# Patient Record
Sex: MALE | Race: WHITE | NOT HISPANIC OR LATINO
[De-identification: names, ages, dates, MRNs, and addresses within clinical notes are randomized per-mention and may not be internally consistent; named-entity substitution may affect disease eponyms.]

---

## 2017-01-26 ENCOUNTER — APPOINTMENT (OUTPATIENT)
Dept: INTERNAL MEDICINE | Facility: CLINIC | Age: 67
End: 2017-01-26

## 2017-01-26 ENCOUNTER — RESULT CHARGE (OUTPATIENT)
Age: 67
End: 2017-01-26

## 2017-02-07 ENCOUNTER — RX RENEWAL (OUTPATIENT)
Age: 67
End: 2017-02-07

## 2017-03-03 LAB
ALBUMIN SERPL ELPH-MCNC: 4.3 G/DL
ALP BLD-CCNC: 83 U/L
ALT SERPL-CCNC: 20 U/L
ANION GAP SERPL CALC-SCNC: 14 MMOL/L
AST SERPL-CCNC: 20 U/L
BILIRUB SERPL-MCNC: 0.8 MG/DL
BUN SERPL-MCNC: 16 MG/DL
CALCIUM SERPL-MCNC: 9.5 MG/DL
CHLORIDE SERPL-SCNC: 100 MMOL/L
CHOLEST SERPL-MCNC: 191 MG/DL
CHOLEST/HDLC SERPL: 1.9 RATIO
CO2 SERPL-SCNC: 25 MMOL/L
CREAT SERPL-MCNC: 0.92 MG/DL
GLUCOSE SERPL-MCNC: 118 MG/DL
HDLC SERPL-MCNC: 98 MG/DL
LDLC SERPL CALC-MCNC: 83 MG/DL
POTASSIUM SERPL-SCNC: 5.2 MMOL/L
PROT SERPL-MCNC: 7.6 G/DL
SODIUM SERPL-SCNC: 139 MMOL/L
TRIGL SERPL-MCNC: 48 MG/DL

## 2017-03-06 LAB
25(OH)D3 SERPL-MCNC: 13.2 NG/ML
HBA1C MFR BLD HPLC: 6.6 %

## 2017-03-09 ENCOUNTER — APPOINTMENT (OUTPATIENT)
Dept: ENDOCRINOLOGY | Facility: CLINIC | Age: 67
End: 2017-03-09

## 2017-03-09 VITALS
HEART RATE: 62 BPM | OXYGEN SATURATION: 94 % | DIASTOLIC BLOOD PRESSURE: 70 MMHG | HEIGHT: 72 IN | WEIGHT: 164 LBS | SYSTOLIC BLOOD PRESSURE: 110 MMHG | BODY MASS INDEX: 22.21 KG/M2

## 2017-03-16 LAB
CREAT SPEC-SCNC: 96 MG/DL
MICROALBUMIN 24H UR DL<=1MG/L-MCNC: 5.4 MG/DL
MICROALBUMIN/CREAT 24H UR-RTO: 56 UG/MG

## 2017-05-18 ENCOUNTER — OUTPATIENT (OUTPATIENT)
Dept: OUTPATIENT SERVICES | Facility: HOSPITAL | Age: 67
LOS: 1 days | End: 2017-05-18
Payer: COMMERCIAL

## 2017-05-18 ENCOUNTER — APPOINTMENT (OUTPATIENT)
Dept: RADIOLOGY | Facility: CLINIC | Age: 67
End: 2017-05-18

## 2017-05-18 ENCOUNTER — APPOINTMENT (OUTPATIENT)
Dept: INTERNAL MEDICINE | Facility: CLINIC | Age: 67
End: 2017-05-18

## 2017-05-18 VITALS
HEART RATE: 64 BPM | SYSTOLIC BLOOD PRESSURE: 130 MMHG | TEMPERATURE: 96.4 F | DIASTOLIC BLOOD PRESSURE: 90 MMHG | WEIGHT: 166 LBS | BODY MASS INDEX: 22.51 KG/M2
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
## 2017-05-18 DIAGNOSIS — M79.609 PAIN IN UNSPECIFIED LIMB: ICD-10-CM

## 2017-05-18 PROCEDURE — 71045 X-RAY EXAM CHEST 1 VIEW: CPT

## 2017-05-18 RX ORDER — LATANOPROST/PF 0.005 %
0.01 DROPS OPHTHALMIC (EYE)
Qty: 2 | Refills: 0 | Status: DISCONTINUED | COMMUNITY
Start: 2017-03-07

## 2017-05-18 RX ORDER — TIMOLOL MALEATE 5 MG/ML
0.5 SOLUTION OPHTHALMIC
Qty: 5 | Refills: 0 | Status: DISCONTINUED | COMMUNITY
Start: 2017-04-20

## 2017-05-22 ENCOUNTER — RESULT REVIEW (OUTPATIENT)
Age: 67
End: 2017-05-22

## 2017-06-16 ENCOUNTER — APPOINTMENT (OUTPATIENT)
Dept: INTERNAL MEDICINE | Facility: CLINIC | Age: 67
End: 2017-06-16

## 2017-06-16 VITALS
BODY MASS INDEX: 22.48 KG/M2 | HEIGHT: 72 IN | WEIGHT: 166 LBS | DIASTOLIC BLOOD PRESSURE: 84 MMHG | SYSTOLIC BLOOD PRESSURE: 128 MMHG

## 2017-06-16 DIAGNOSIS — J92.9 PLEURAL PLAQUE W/OUT ASBESTOS: ICD-10-CM

## 2017-06-16 DIAGNOSIS — M40.204 UNSPECIFIED KYPHOSIS, THORACIC REGION: ICD-10-CM

## 2017-07-25 ENCOUNTER — LABORATORY RESULT (OUTPATIENT)
Age: 67
End: 2017-07-25

## 2017-07-25 ENCOUNTER — APPOINTMENT (OUTPATIENT)
Dept: INTERNAL MEDICINE | Facility: CLINIC | Age: 67
End: 2017-07-25

## 2017-07-25 VITALS
BODY MASS INDEX: 21.94 KG/M2 | SYSTOLIC BLOOD PRESSURE: 110 MMHG | HEIGHT: 72 IN | WEIGHT: 162 LBS | TEMPERATURE: 95.2 F | DIASTOLIC BLOOD PRESSURE: 70 MMHG

## 2017-07-25 DIAGNOSIS — Z01.818 ENCOUNTER FOR OTHER PREPROCEDURAL EXAMINATION: ICD-10-CM

## 2017-07-25 DIAGNOSIS — H26.9 UNSPECIFIED CATARACT: ICD-10-CM

## 2017-07-27 LAB
ALBUMIN SERPL ELPH-MCNC: 4.2 G/DL
ALP BLD-CCNC: 70 U/L
ALT SERPL-CCNC: 14 U/L
ANION GAP SERPL CALC-SCNC: 16 MMOL/L
AST SERPL-CCNC: 22 U/L
BASOPHILS # BLD AUTO: 0.02 K/UL
BASOPHILS NFR BLD AUTO: 0.5 %
BILIRUB SERPL-MCNC: 1.1 MG/DL
BUN SERPL-MCNC: 17 MG/DL
CALCIUM SERPL-MCNC: 9.5 MG/DL
CHLORIDE SERPL-SCNC: 98 MMOL/L
CO2 SERPL-SCNC: 26 MMOL/L
CREAT SERPL-MCNC: 1.08 MG/DL
EOSINOPHIL # BLD AUTO: 0.13 K/UL
EOSINOPHIL NFR BLD AUTO: 3.1 %
GLUCOSE SERPL-MCNC: 187 MG/DL
HCT VFR BLD CALC: 41.2 %
HGB BLD-MCNC: 13.6 G/DL
IMM GRANULOCYTES NFR BLD AUTO: 0 %
LYMPHOCYTES # BLD AUTO: 1.12 K/UL
LYMPHOCYTES NFR BLD AUTO: 26.4 %
MAN DIFF?: NORMAL
MCHC RBC-ENTMCNC: 29.7 PG
MCHC RBC-ENTMCNC: 33 GM/DL
MCV RBC AUTO: 90 FL
MONOCYTES # BLD AUTO: 0.29 K/UL
MONOCYTES NFR BLD AUTO: 6.8 %
NEUTROPHILS # BLD AUTO: 2.69 K/UL
NEUTROPHILS NFR BLD AUTO: 63.2 %
PLATELET # BLD AUTO: 232 K/UL
POTASSIUM SERPL-SCNC: 4.8 MMOL/L
PROT SERPL-MCNC: 7.3 G/DL
RBC # BLD: 4.58 M/UL
RBC # FLD: 15.4 %
SODIUM SERPL-SCNC: 140 MMOL/L
WBC # FLD AUTO: 4.25 K/UL

## 2017-09-13 ENCOUNTER — APPOINTMENT (OUTPATIENT)
Dept: ENDOCRINOLOGY | Facility: CLINIC | Age: 67
End: 2017-09-13
Payer: MEDICARE

## 2017-09-13 VITALS
HEART RATE: 61 BPM | OXYGEN SATURATION: 98 % | DIASTOLIC BLOOD PRESSURE: 80 MMHG | BODY MASS INDEX: 22.21 KG/M2 | WEIGHT: 164 LBS | SYSTOLIC BLOOD PRESSURE: 118 MMHG | HEIGHT: 72 IN

## 2017-09-13 LAB
GLUCOSE BLDC GLUCOMTR-MCNC: 155
HBA1C MFR BLD HPLC: 6.3

## 2017-09-13 PROCEDURE — 83036 HEMOGLOBIN GLYCOSYLATED A1C: CPT | Mod: QW

## 2017-09-13 PROCEDURE — 99214 OFFICE O/P EST MOD 30 MIN: CPT

## 2017-09-13 PROCEDURE — 82962 GLUCOSE BLOOD TEST: CPT

## 2017-12-06 ENCOUNTER — APPOINTMENT (OUTPATIENT)
Dept: CARDIOLOGY | Facility: CLINIC | Age: 67
End: 2017-12-06

## 2017-12-12 ENCOUNTER — APPOINTMENT (OUTPATIENT)
Dept: INTERNAL MEDICINE | Facility: CLINIC | Age: 67
End: 2017-12-12

## 2018-01-09 ENCOUNTER — MEDICATION RENEWAL (OUTPATIENT)
Age: 68
End: 2018-01-09

## 2018-01-17 ENCOUNTER — LABORATORY RESULT (OUTPATIENT)
Age: 68
End: 2018-01-17

## 2018-01-17 ENCOUNTER — APPOINTMENT (OUTPATIENT)
Dept: CARDIOLOGY | Facility: CLINIC | Age: 68
End: 2018-01-17
Payer: MEDICARE

## 2018-01-17 ENCOUNTER — NON-APPOINTMENT (OUTPATIENT)
Age: 68
End: 2018-01-17

## 2018-01-17 VITALS
SYSTOLIC BLOOD PRESSURE: 146 MMHG | OXYGEN SATURATION: 100 % | BODY MASS INDEX: 8.54 KG/M2 | DIASTOLIC BLOOD PRESSURE: 88 MMHG | HEART RATE: 58 BPM | WEIGHT: 63 LBS

## 2018-01-17 VITALS — SYSTOLIC BLOOD PRESSURE: 136 MMHG | DIASTOLIC BLOOD PRESSURE: 86 MMHG

## 2018-01-17 DIAGNOSIS — N20.0 CALCULUS OF KIDNEY: ICD-10-CM
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
## 2018-01-17 DIAGNOSIS — R79.89 OTHER SPECIFIED ABNORMAL FINDINGS OF BLOOD CHEMISTRY: ICD-10-CM

## 2018-01-17 DIAGNOSIS — N40.0 BENIGN PROSTATIC HYPERPLASIA WITHOUT LOWER URINARY TRACT SYMPMS: ICD-10-CM

## 2018-01-17 PROCEDURE — 93000 ELECTROCARDIOGRAM COMPLETE: CPT

## 2018-01-17 PROCEDURE — 93040 RHYTHM ECG WITH REPORT: CPT | Mod: 59

## 2018-01-17 PROCEDURE — 36415 COLL VENOUS BLD VENIPUNCTURE: CPT

## 2018-01-17 PROCEDURE — 99204 OFFICE O/P NEW MOD 45 MIN: CPT

## 2018-01-17 RX ORDER — BENZONATATE 100 MG/1
100 CAPSULE ORAL
Qty: 45 | Refills: 0 | Status: DISCONTINUED | COMMUNITY
Start: 2017-05-18 | End: 2018-01-17

## 2018-01-17 RX ORDER — ERGOCALCIFEROL 1.25 MG/1
1.25 MG CAPSULE, LIQUID FILLED ORAL
Qty: 12 | Refills: 0 | Status: DISCONTINUED | COMMUNITY
Start: 2017-03-09 | End: 2018-01-17

## 2018-01-28 ENCOUNTER — TRANSCRIPTION ENCOUNTER (OUTPATIENT)
Age: 68
End: 2018-01-28

## 2018-01-30 ENCOUNTER — APPOINTMENT (OUTPATIENT)
Dept: INFECTIOUS DISEASE | Facility: CLINIC | Age: 68
End: 2018-01-30
Payer: SELF-PAY

## 2018-01-30 ENCOUNTER — MEDICATION RENEWAL (OUTPATIENT)
Age: 68
End: 2018-01-30

## 2018-01-30 DIAGNOSIS — Z71.89 OTHER SPECIFIED COUNSELING: ICD-10-CM

## 2018-01-30 PROCEDURE — 90472 IMMUNIZATION ADMIN EACH ADD: CPT | Mod: NC

## 2018-01-30 PROCEDURE — 90691 TYPHOID VACCINE IM: CPT

## 2018-01-30 PROCEDURE — 99401 PREV MED CNSL INDIV APPRX 15: CPT | Mod: 25

## 2018-01-30 PROCEDURE — 90471 IMMUNIZATION ADMIN: CPT | Mod: NC

## 2018-01-30 PROCEDURE — 90632 HEPA VACCINE ADULT IM: CPT

## 2018-02-03 LAB
ALBUMIN SERPL ELPH-MCNC: 4.2 G/DL
ALP BLD-CCNC: 65 U/L
ALT SERPL-CCNC: 18 U/L
ANION GAP SERPL CALC-SCNC: 11 MMOL/L
AST SERPL-CCNC: 22 U/L
BASOPHILS # BLD AUTO: 0.03 K/UL
BASOPHILS NFR BLD AUTO: 0.6 %
BILIRUB SERPL-MCNC: 0.7 MG/DL
BUN SERPL-MCNC: 19 MG/DL
CALCIUM SERPL-MCNC: 9.4 MG/DL
CHLORIDE SERPL-SCNC: 97 MMOL/L
CHOLEST SERPL-MCNC: 186 MG/DL
CHOLEST/HDLC SERPL: 2.2 RATIO
CO2 SERPL-SCNC: 28 MMOL/L
CREAT SERPL-MCNC: 1.01 MG/DL
EOSINOPHIL # BLD AUTO: 0.21 K/UL
EOSINOPHIL NFR BLD AUTO: 4 %
GLUCOSE SERPL-MCNC: 119 MG/DL
HBA1C MFR BLD HPLC: 6.6 %
HCT VFR BLD CALC: 45 %
HDLC SERPL-MCNC: 83 MG/DL
HGB BLD-MCNC: 14.9 G/DL
IMM GRANULOCYTES NFR BLD AUTO: 0.2 %
LDLC SERPL CALC-MCNC: 92 MG/DL
LDLC SERPL DIRECT ASSAY-MCNC: 96 MG/DL
LYMPHOCYTES # BLD AUTO: 1.23 K/UL
LYMPHOCYTES NFR BLD AUTO: 23.7 %
MAN DIFF?: NORMAL
MCHC RBC-ENTMCNC: 30 PG
MCHC RBC-ENTMCNC: 33.1 GM/DL
MCV RBC AUTO: 90.7 FL
MONOCYTES # BLD AUTO: 0.58 K/UL
MONOCYTES NFR BLD AUTO: 11.2 %
NEUTROPHILS # BLD AUTO: 3.14 K/UL
NEUTROPHILS NFR BLD AUTO: 60.3 %
PLATELET # BLD AUTO: 189 K/UL
POTASSIUM SERPL-SCNC: 4.2 MMOL/L
PROT SERPL-MCNC: 7.9 G/DL
RBC # BLD: 4.96 M/UL
RBC # FLD: 14.7 %
SODIUM SERPL-SCNC: 136 MMOL/L
T3 SERPL-MCNC: 96 NG/DL
T3RU NFR SERPL: 0.94 INDEX
T4 FREE SERPL-MCNC: 1.5 NG/DL
T4 SERPL-MCNC: 6.6 UG/DL
TRIGL SERPL-MCNC: 55 MG/DL
TSH SERPL-ACNC: 3.63 UIU/ML
WBC # FLD AUTO: 5.2 K/UL

## 2018-02-13 ENCOUNTER — MEDICATION RENEWAL (OUTPATIENT)
Age: 68
End: 2018-02-13

## 2018-02-19 ENCOUNTER — NON-APPOINTMENT (OUTPATIENT)
Age: 68
End: 2018-02-19

## 2018-03-08 ENCOUNTER — RX RENEWAL (OUTPATIENT)
Age: 68
End: 2018-03-08

## 2018-03-14 ENCOUNTER — APPOINTMENT (OUTPATIENT)
Dept: ENDOCRINOLOGY | Facility: CLINIC | Age: 68
End: 2018-03-14
Payer: MEDICARE

## 2018-03-14 VITALS
OXYGEN SATURATION: 95 % | SYSTOLIC BLOOD PRESSURE: 120 MMHG | WEIGHT: 162 LBS | BODY MASS INDEX: 21.94 KG/M2 | HEART RATE: 58 BPM | HEIGHT: 72 IN | DIASTOLIC BLOOD PRESSURE: 60 MMHG

## 2018-03-14 PROCEDURE — 99214 OFFICE O/P EST MOD 30 MIN: CPT

## 2018-03-14 RX ORDER — AZITHROMYCIN 250 MG/1
250 TABLET, FILM COATED ORAL
Qty: 4 | Refills: 0 | Status: DISCONTINUED | COMMUNITY
Start: 2018-01-30 | End: 2018-03-14

## 2018-03-14 RX ORDER — OSELTAMIVIR PHOSPHATE 75 MG/1
75 CAPSULE ORAL TWICE DAILY
Qty: 10 | Refills: 0 | Status: DISCONTINUED | COMMUNITY
Start: 2018-02-12 | End: 2018-03-14

## 2018-03-15 LAB
CREAT SPEC-SCNC: 57 MG/DL
MICROALBUMIN 24H UR DL<=1MG/L-MCNC: 4.2 MG/DL
MICROALBUMIN/CREAT 24H UR-RTO: 74 MG/G

## 2018-03-16 ENCOUNTER — RX RENEWAL (OUTPATIENT)
Age: 68
End: 2018-03-16

## 2018-04-02 ENCOUNTER — APPOINTMENT (OUTPATIENT)
Dept: CARDIOLOGY | Facility: CLINIC | Age: 68
End: 2018-04-02
Payer: MEDICARE

## 2018-04-02 PROCEDURE — 93306 TTE W/DOPPLER COMPLETE: CPT

## 2018-04-16 ENCOUNTER — NON-APPOINTMENT (OUTPATIENT)
Age: 68
End: 2018-04-16

## 2018-04-16 ENCOUNTER — APPOINTMENT (OUTPATIENT)
Dept: CARDIOLOGY | Facility: CLINIC | Age: 68
End: 2018-04-16
Payer: MEDICARE

## 2018-04-16 VITALS — SYSTOLIC BLOOD PRESSURE: 130 MMHG | HEART RATE: 54 BPM | OXYGEN SATURATION: 100 % | DIASTOLIC BLOOD PRESSURE: 80 MMHG

## 2018-04-16 VITALS — WEIGHT: 165 LBS | BODY MASS INDEX: 22.38 KG/M2

## 2018-04-16 VITALS — SYSTOLIC BLOOD PRESSURE: 124 MMHG | DIASTOLIC BLOOD PRESSURE: 86 MMHG

## 2018-04-16 DIAGNOSIS — D41.4 NEOPLASM OF UNCERTAIN BEHAVIOR OF BLADDER: ICD-10-CM

## 2018-04-16 DIAGNOSIS — Z78.9 OTHER SPECIFIED HEALTH STATUS: ICD-10-CM

## 2018-04-16 PROCEDURE — 36415 COLL VENOUS BLD VENIPUNCTURE: CPT

## 2018-04-16 PROCEDURE — 93000 ELECTROCARDIOGRAM COMPLETE: CPT

## 2018-04-16 PROCEDURE — 93040 RHYTHM ECG WITH REPORT: CPT | Mod: 59

## 2018-04-16 PROCEDURE — 99215 OFFICE O/P EST HI 40 MIN: CPT

## 2018-04-18 ENCOUNTER — NON-APPOINTMENT (OUTPATIENT)
Age: 68
End: 2018-04-18

## 2018-04-21 ENCOUNTER — NON-APPOINTMENT (OUTPATIENT)
Age: 68
End: 2018-04-21

## 2018-04-23 ENCOUNTER — APPOINTMENT (OUTPATIENT)
Dept: CARDIOLOGY | Facility: CLINIC | Age: 68
End: 2018-04-23
Payer: MEDICARE

## 2018-04-23 VITALS
HEART RATE: 66 BPM | SYSTOLIC BLOOD PRESSURE: 143 MMHG | BODY MASS INDEX: 22.11 KG/M2 | WEIGHT: 163 LBS | DIASTOLIC BLOOD PRESSURE: 84 MMHG

## 2018-04-23 PROCEDURE — 93351 STRESS TTE COMPLETE: CPT

## 2018-04-23 PROCEDURE — 93325 DOPPLER ECHO COLOR FLOW MAPG: CPT

## 2018-04-23 PROCEDURE — 93320 DOPPLER ECHO COMPLETE: CPT

## 2018-04-23 PROCEDURE — 99214 OFFICE O/P EST MOD 30 MIN: CPT | Mod: 25

## 2018-04-29 ENCOUNTER — NON-APPOINTMENT (OUTPATIENT)
Age: 68
End: 2018-04-29

## 2018-05-06 LAB
ALBUMIN SERPL ELPH-MCNC: 4.5 G/DL
ALP BLD-CCNC: 71 U/L
ALT SERPL-CCNC: 23 U/L
ANION GAP SERPL CALC-SCNC: 10 MMOL/L
APPEARANCE: CLEAR
AST SERPL-CCNC: 28 U/L
BACTERIA: NEGATIVE
BASOPHILS # BLD AUTO: 0.04 K/UL
BASOPHILS NFR BLD AUTO: 0.8 %
BILIRUB SERPL-MCNC: 0.8 MG/DL
BILIRUBIN URINE: NEGATIVE
BLOOD URINE: NEGATIVE
BUN SERPL-MCNC: 17 MG/DL
CALCIUM SERPL-MCNC: 9.3 MG/DL
CHLORIDE SERPL-SCNC: 101 MMOL/L
CHOLEST SERPL-MCNC: 136 MG/DL
CHOLEST/HDLC SERPL: 1.6 RATIO
CO2 SERPL-SCNC: 29 MMOL/L
COLOR: YELLOW
CREAT SERPL-MCNC: 0.98 MG/DL
EOSINOPHIL # BLD AUTO: 0.15 K/UL
EOSINOPHIL NFR BLD AUTO: 3.1 %
GLUCOSE QUALITATIVE U: NEGATIVE MG/DL
GLUCOSE SERPL-MCNC: 111 MG/DL
HBA1C MFR BLD HPLC: 6.7 %
HCT VFR BLD CALC: 41.4 %
HDLC SERPL-MCNC: 83 MG/DL
HGB BLD-MCNC: 13.4 G/DL
HYALINE CASTS: 6 /LPF
IMM GRANULOCYTES NFR BLD AUTO: 0.2 %
KETONES URINE: NEGATIVE
LDLC SERPL CALC-MCNC: 47 MG/DL
LDLC SERPL DIRECT ASSAY-MCNC: 52 MG/DL
LEUKOCYTE ESTERASE URINE: NEGATIVE
LYMPHOCYTES # BLD AUTO: 1.36 K/UL
LYMPHOCYTES NFR BLD AUTO: 27.9 %
MAN DIFF?: NORMAL
MCHC RBC-ENTMCNC: 29.6 PG
MCHC RBC-ENTMCNC: 32.4 GM/DL
MCV RBC AUTO: 91.4 FL
MICROSCOPIC-UA: NORMAL
MONOCYTES # BLD AUTO: 0.42 K/UL
MONOCYTES NFR BLD AUTO: 8.6 %
NEUTROPHILS # BLD AUTO: 2.9 K/UL
NEUTROPHILS NFR BLD AUTO: 59.4 %
NITRITE URINE: NEGATIVE
PH URINE: 6
PLATELET # BLD AUTO: 194 K/UL
POTASSIUM SERPL-SCNC: 4.8 MMOL/L
PROT SERPL-MCNC: 7.3 G/DL
PROTEIN URINE: NEGATIVE MG/DL
PSA SERPL-MCNC: 1.89 NG/ML
RBC # BLD: 4.53 M/UL
RBC # FLD: 15.7 %
RED BLOOD CELLS URINE: 6 /HPF
SODIUM SERPL-SCNC: 140 MMOL/L
SPECIFIC GRAVITY URINE: 1.01
SQUAMOUS EPITHELIAL CELLS: 1 /HPF
TRIGL SERPL-MCNC: 30 MG/DL
UROBILINOGEN URINE: NEGATIVE MG/DL
WBC # FLD AUTO: 4.88 K/UL
WHITE BLOOD CELLS URINE: 5 /HPF

## 2018-06-13 ENCOUNTER — RX RENEWAL (OUTPATIENT)
Age: 68
End: 2018-06-13

## 2018-08-09 ENCOUNTER — APPOINTMENT (OUTPATIENT)
Dept: CARDIOLOGY | Facility: CLINIC | Age: 68
End: 2018-08-09
Payer: MEDICARE

## 2018-08-09 ENCOUNTER — NON-APPOINTMENT (OUTPATIENT)
Age: 68
End: 2018-08-09

## 2018-08-09 VITALS
TEMPERATURE: 97.8 F | BODY MASS INDEX: 21.94 KG/M2 | HEIGHT: 72 IN | WEIGHT: 162 LBS | OXYGEN SATURATION: 98 % | HEART RATE: 58 BPM

## 2018-08-09 PROCEDURE — 93040 RHYTHM ECG WITH REPORT: CPT

## 2018-08-09 PROCEDURE — 99214 OFFICE O/P EST MOD 30 MIN: CPT | Mod: 25

## 2018-08-09 PROCEDURE — 36415 COLL VENOUS BLD VENIPUNCTURE: CPT

## 2018-08-31 LAB
ALBUMIN SERPL ELPH-MCNC: 4.3 G/DL
ALP BLD-CCNC: 55 U/L
ALT SERPL-CCNC: 20 U/L
ANION GAP SERPL CALC-SCNC: 14 MMOL/L
AST SERPL-CCNC: 20 U/L
BILIRUB SERPL-MCNC: 1 MG/DL
BUN SERPL-MCNC: 21 MG/DL
CALCIUM SERPL-MCNC: 9.4 MG/DL
CHLORIDE SERPL-SCNC: 97 MMOL/L
CHOLEST SERPL-MCNC: 128 MG/DL
CHOLEST/HDLC SERPL: 1.7 RATIO
CO2 SERPL-SCNC: 28 MMOL/L
CREAT SERPL-MCNC: 1.03 MG/DL
GLUCOSE SERPL-MCNC: 176 MG/DL
HBA1C MFR BLD HPLC: 6.6 %
HDLC SERPL-MCNC: 76 MG/DL
LDLC SERPL CALC-MCNC: 43 MG/DL
LDLC SERPL DIRECT ASSAY-MCNC: 52 MG/DL
POTASSIUM SERPL-SCNC: 4.9 MMOL/L
PROT SERPL-MCNC: 6.9 G/DL
SODIUM SERPL-SCNC: 138 MMOL/L
TRIGL SERPL-MCNC: 44 MG/DL

## 2018-09-06 ENCOUNTER — RX RENEWAL (OUTPATIENT)
Age: 68
End: 2018-09-06

## 2018-09-18 ENCOUNTER — APPOINTMENT (OUTPATIENT)
Dept: ENDOCRINOLOGY | Facility: CLINIC | Age: 68
End: 2018-09-18

## 2018-09-18 ENCOUNTER — APPOINTMENT (OUTPATIENT)
Dept: ENDOCRINOLOGY | Facility: CLINIC | Age: 68
End: 2018-09-18
Payer: MEDICARE

## 2018-09-18 VITALS — SYSTOLIC BLOOD PRESSURE: 126 MMHG | WEIGHT: 162 LBS | DIASTOLIC BLOOD PRESSURE: 80 MMHG | BODY MASS INDEX: 21.97 KG/M2

## 2018-09-18 LAB — HBA1C MFR BLD HPLC: 6.3

## 2018-09-18 PROCEDURE — G0108 DIAB MANAGE TRN  PER INDIV: CPT

## 2018-09-18 PROCEDURE — 83036 HEMOGLOBIN GLYCOSYLATED A1C: CPT | Mod: QW

## 2018-09-20 ENCOUNTER — RX RENEWAL (OUTPATIENT)
Age: 68
End: 2018-09-20

## 2018-10-07 ENCOUNTER — NON-APPOINTMENT (OUTPATIENT)
Age: 68
End: 2018-10-07

## 2018-11-12 ENCOUNTER — RX RENEWAL (OUTPATIENT)
Age: 68
End: 2018-11-12

## 2018-12-03 ENCOUNTER — APPOINTMENT (OUTPATIENT)
Dept: CARDIOLOGY | Facility: CLINIC | Age: 68
End: 2018-12-03
Payer: MEDICARE

## 2018-12-03 ENCOUNTER — NON-APPOINTMENT (OUTPATIENT)
Age: 68
End: 2018-12-03

## 2018-12-03 VITALS — SYSTOLIC BLOOD PRESSURE: 124 MMHG | DIASTOLIC BLOOD PRESSURE: 82 MMHG

## 2018-12-03 VITALS
WEIGHT: 159 LBS | DIASTOLIC BLOOD PRESSURE: 80 MMHG | BODY MASS INDEX: 21.56 KG/M2 | OXYGEN SATURATION: 100 % | HEART RATE: 53 BPM | SYSTOLIC BLOOD PRESSURE: 122 MMHG

## 2018-12-03 PROCEDURE — 93040 RHYTHM ECG WITH REPORT: CPT | Mod: 59

## 2018-12-03 PROCEDURE — 99214 OFFICE O/P EST MOD 30 MIN: CPT

## 2018-12-03 PROCEDURE — 93000 ELECTROCARDIOGRAM COMPLETE: CPT

## 2018-12-03 PROCEDURE — 36415 COLL VENOUS BLD VENIPUNCTURE: CPT

## 2018-12-03 NOTE — REASON FOR VISIT
[Follow-Up - Clinic] : a clinic follow-up of [Hyperlipidemia] : hyperlipidemia [Hypertension] : hypertension [FreeTextEntry1] : DM

## 2018-12-05 ENCOUNTER — RX RENEWAL (OUTPATIENT)
Age: 68
End: 2018-12-05

## 2018-12-19 LAB
ALBUMIN SERPL ELPH-MCNC: 4.5 G/DL
ALP BLD-CCNC: 56 U/L
ALT SERPL-CCNC: 23 U/L
ANION GAP SERPL CALC-SCNC: 12 MMOL/L
AST SERPL-CCNC: 22 U/L
BILIRUB SERPL-MCNC: 1.2 MG/DL
BUN SERPL-MCNC: 20 MG/DL
CALCIUM SERPL-MCNC: 9.3 MG/DL
CHLORIDE SERPL-SCNC: 100 MMOL/L
CHOLEST SERPL-MCNC: 137 MG/DL
CHOLEST/HDLC SERPL: 1.8 RATIO
CO2 SERPL-SCNC: 28 MMOL/L
CREAT SERPL-MCNC: 1 MG/DL
GLUCOSE SERPL-MCNC: 117 MG/DL
HBA1C MFR BLD HPLC: 6.4 %
HDLC SERPL-MCNC: 76 MG/DL
LDLC SERPL CALC-MCNC: 51 MG/DL
LDLC SERPL DIRECT ASSAY-MCNC: 62 MG/DL
POTASSIUM SERPL-SCNC: 4.9 MMOL/L
PROT SERPL-MCNC: 7.7 G/DL
SODIUM SERPL-SCNC: 139 MMOL/L
TRIGL SERPL-MCNC: 50 MG/DL

## 2018-12-30 ENCOUNTER — NON-APPOINTMENT (OUTPATIENT)
Age: 68
End: 2018-12-30

## 2019-01-18 ENCOUNTER — APPOINTMENT (OUTPATIENT)
Dept: ORTHOPEDIC SURGERY | Facility: CLINIC | Age: 69
End: 2019-01-18
Payer: MEDICARE

## 2019-01-18 VITALS — BODY MASS INDEX: 21.54 KG/M2 | HEIGHT: 72 IN | WEIGHT: 159 LBS

## 2019-01-18 DIAGNOSIS — M75.82 OTHER SHOULDER LESIONS, LEFT SHOULDER: ICD-10-CM

## 2019-01-18 DIAGNOSIS — M47.812 SPONDYLOSIS W/OUT MYELOPATHY OR RADICULOPATHY, CERVICAL REGION: ICD-10-CM

## 2019-01-18 PROCEDURE — 99204 OFFICE O/P NEW MOD 45 MIN: CPT

## 2019-01-18 PROCEDURE — 73030 X-RAY EXAM OF SHOULDER: CPT | Mod: LT

## 2019-01-18 NOTE — HISTORY OF PRESENT ILLNESS
[de-identified] : Patient is right-hand dominant and complains of left shoulder pain, which started 6 months ago. He notes that it has been bothering him for many months. There was no injury. He notes stiffness and pain when raising the arm forward and out to the side. He denies weakness in his left shoulder but his range of motion is restricted. He also notes pain that radiates into his upper arm. He denies pain while sleeping and has no numbness and tingling. The pain does not wake him up from sleep. He has not noticed any decreased strength.

## 2019-01-18 NOTE — ADDENDUM
[FreeTextEntry1] : I, Guero Campoverde , acted solely as a scribe for Dr. Mamadou Sparrow on this date 01/18/2019.\par All medical record entries made by the Scribe were at my, Dr. Mamadou Sparrow, direction and personally dictated by me on 01/18/2019. I have reviewed the chart and agree that the record accurately reflects my personal performance of the history, physical exam, assessment and plan. I have also personally directed, reviewed, and agreed with the chart. \par

## 2019-01-18 NOTE — PHYSICAL EXAM
[UE] : Sensory: Intact in bilateral upper extremities [de-identified] : Constitutional\par o Appearance : well-nourished, well developed, alert, in no acute distress \par Head and Face\par o Head :\par ¦ Inspection : atraumatic, normocephalic\par Neurologic\par o Mental Status Examination :\par ¦ Orientation : alert and oriented X 3\par Psychiatric\par o Mood and Affect: mood normal, affect appropriate \par Cardiovascular\par o Observation/Palpation : - no swelling,normal pulses, normal temperature \par Lymphatic\par o Additional Nodes : No palpable lymph nodes present \par \par Cervical Spine\par o Inspection/Palpation :\par ¦ Inspection : alignment midline, decreased degree of lordosis present,  significant kyphotic deformity\par ¦ Skin : normal appearance, no masses or tenderness, trachea midline\par ¦ Palpation : musculature is nontender to palpation\par o Range of Motion : limited rotation \par Tests: Negative Spurling test, negative Romberg test\par \par Right Upper Extremity\par o Shoulder :\par ¦ Inspection/Palpation : no tenderness, swelling or deformities\par ¦ Range of Motion : forward flexion to 115 degrees, no crepitance, non-limited internal rotation\par ¦ Strength : forward elevation, internal rotation, external rotation, adduction and abduction and supraspinatus 5/5\par ¦ Stability : no joint instability on provocative testing \par Tests: Willis negative, Neer negative, negative Eduard test negative drop arm test\par \par Left Upper Extremity\par o Shoulder :\par ¦ Inspection/Palpation : minimal anterior capsular tenderness, no swelling or deformities\par ¦ Range of Motion : forward flexion to 115 degrees, no crepitance, non-limited internal rotation\par ¦ Strength : forward elevation, internal rotation, external rotation 4+/5, adduction and abduction and supraspinatus 4-/5\par ¦ Stability : no joint instability on provocative testing\par Tests: Willis positive, positive Neer equivocal, negative Eduard test, negative drop arm test\par \par Gait: kyphotic gait, no significant extremity swelling or lymphedema\par \par Radiology Results \par o Left Shoulder : AP internal/external rotation and lateral views were obtained and revealed sclerosis of greater tuberosity with moderate glenohumeral arthritis\par \par \par

## 2019-01-18 NOTE — DISCUSSION/SUMMARY
[de-identified] : I recommend starting a course of physical therapy for strengthening and flexibility of his left shoulder. A prescription for physical therapy was provided. He is to do home exercises as instructed by physical therapy. In the interim, he should avoid heavy loading his shoulders and overhead activities. Should his pain persist in his left shoulder after 1 month, a cortisone injection will be considered. 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

## 2019-01-28 ENCOUNTER — APPOINTMENT (OUTPATIENT)
Dept: ENDOCRINOLOGY | Facility: CLINIC | Age: 69
End: 2019-01-28
Payer: MEDICARE

## 2019-01-28 VITALS
HEIGHT: 72 IN | DIASTOLIC BLOOD PRESSURE: 80 MMHG | SYSTOLIC BLOOD PRESSURE: 120 MMHG | WEIGHT: 156 LBS | BODY MASS INDEX: 21.13 KG/M2

## 2019-01-28 PROCEDURE — 99214 OFFICE O/P EST MOD 30 MIN: CPT

## 2019-01-28 NOTE — HISTORY OF PRESENT ILLNESS
[FreeTextEntry1] : Mr. TANNA NASH is 68 year old male here to follow up for type 2 DM, osteopenia. \par He used to see Dr. Cortes in our office. \par He has been diagnosed with Type 2 DM since around 2000.  He had been on medication for about 10 years. \par He has no known retinopathy, just saw eye doctor in Jan 2019.  \par He is up to date with podiatry, has ingrown toe nails, no neuropathy.  \par He reports no any neuropathy, his fingers sometimes get very cold sometimes.  \par He reports no diabetic nephropathy. (EGFR 76).\par He follows with cardiologist.  He has no prior history of macrovascular disease such as CAD, CHF or CVA. \par He is currently taking Lipitor at the recommendation of his cardiologist/pcp.   \par He checks his blood sugars every day in the morning.  \par Glucose numbers are usually 105 mg/dl this morning, sometimes it goes up to 120mg/dl.  \par He is tolerating metformin extended release 750 mg 2 tablets. (1500mg daily total). \par He does exercise.  He had ingrown toenails and now he is running a little bit less.  \par In regards to his diet, his family reports that he had lost some weight. On chart, lost about 6 lbs since Sept 2018.  He reports he is eating less because recent viral gastroenteritis.  \par Regarding osteopenia, patient has severe kyphosis march 2017 spine -2.3 wrist 0.2 fem neck -1.9 nad is on boniva tolerating well\par \par

## 2019-01-28 NOTE — ASSESSMENT
[FreeTextEntry1] : Mr. TANNA NASH is 68 year old male with well controlled Type 2 DM, HTN, HLD, osteopenia here for follow up visit.  \par \par 1. Type 2 DM\par Well controlled, A1C 6.4% in Dec 2018.  \par Continue with metformin ER 750mg daily \par Will check Vitmain B 12 level next visit (just had blood work in Dec 2018)\par Check microalbumin \par Continue with Carb. consistent diet and exercise. \par \par 2. HLD\par Stable, continue with lipitor\par check annual lipid panel\par \par 3. HTN\par Continue with Ramipril 2.5mg daily \par BP goal 130/80\par \par 4.Ostopenia at multiple sites\par Continue Ibandronate 150mg once a month\par Check DXA (last done in March 2017)\par \par \par Diabetes Health Care Maintenance\par - Opthalmology up to date: Yes\par -Podiatry up to date: Yes\par -Antiplatelet agent: Yes\par  -Urine microalbumin: Check 01/28/2019 \par -ACE-I/ARB: Yes\par -Patient to call for persistent glucose < 70 or > 300\par -Discussed hypoglycemic protocol.  \par -Yearly flu vaccine recommended \par -Vitamin B12 level if on metformin (check next visit)\par -Lipid panel: up to date \par -Statin therapy: yes\par \par EDUCATION: Reviewed 'ABC' of diabetes management (respective goals in parentheses): A1C (<7), blood pressure (<140/90), and cholesterol (LDL <100).\par \par COMPLIANCE at present is estimated to be:  Good \par FOLLOW UP: I recommend that frequency of visits for diabetes care be every 3 month \par   [Diabetes Foot Care] : diabetes foot care [Long Term Vascular Complications] : long term vascular complications of diabetes [Importance of Diet and Exercise] : importance of diet and exercise to improve glycemic control, achieve weight loss and improve cardiovascular health

## 2019-01-28 NOTE — RESULTS/DATA
[L1 - L4] : L1 - L4 [BMD ___ g/cm2] : BMD: [unfilled] g/cm2 [T-Score ___] : T-score: [unfilled] [Z-Score ___] : Z-score: [unfilled] [FreeTextEntry2] : March 2017

## 2019-02-04 ENCOUNTER — RESULT REVIEW (OUTPATIENT)
Age: 69
End: 2019-02-04

## 2019-02-04 ENCOUNTER — APPOINTMENT (OUTPATIENT)
Dept: ENDOCRINOLOGY | Facility: CLINIC | Age: 69
End: 2019-02-04
Payer: MEDICARE

## 2019-02-04 VITALS — BODY MASS INDEX: 23.11 KG/M2 | WEIGHT: 156 LBS | HEIGHT: 69 IN

## 2019-02-04 LAB
CREAT SPEC-SCNC: 139 MG/DL
MICROALBUMIN 24H UR DL<=1MG/L-MCNC: 5.5 MG/DL
MICROALBUMIN/CREAT 24H UR-RTO: 40 MG/G

## 2019-02-04 PROCEDURE — 77085 DXA BONE DENSITY AXL VRT FX: CPT | Mod: GA

## 2019-02-08 ENCOUNTER — RX RENEWAL (OUTPATIENT)
Age: 69
End: 2019-02-08

## 2019-02-28 ENCOUNTER — APPOINTMENT (OUTPATIENT)
Dept: ORTHOPEDIC SURGERY | Facility: CLINIC | Age: 69
End: 2019-02-28
Payer: MEDICARE

## 2019-02-28 VITALS — WEIGHT: 156 LBS | HEIGHT: 69 IN | BODY MASS INDEX: 23.11 KG/M2

## 2019-02-28 VITALS — HEIGHT: 69 IN | BODY MASS INDEX: 23.11 KG/M2 | WEIGHT: 156 LBS

## 2019-02-28 PROCEDURE — 99213 OFFICE O/P EST LOW 20 MIN: CPT

## 2019-03-11 ENCOUNTER — RX RENEWAL (OUTPATIENT)
Age: 69
End: 2019-03-11

## 2019-03-13 ENCOUNTER — TRANSCRIPTION ENCOUNTER (OUTPATIENT)
Age: 69
End: 2019-03-13

## 2019-04-01 ENCOUNTER — APPOINTMENT (OUTPATIENT)
Dept: ORTHOPEDIC SURGERY | Facility: CLINIC | Age: 69
End: 2019-04-01
Payer: MEDICARE

## 2019-04-01 VITALS — WEIGHT: 156 LBS | HEIGHT: 69 IN | BODY MASS INDEX: 23.11 KG/M2

## 2019-04-01 PROCEDURE — 99213 OFFICE O/P EST LOW 20 MIN: CPT

## 2019-04-02 ENCOUNTER — APPOINTMENT (OUTPATIENT)
Dept: CARDIOLOGY | Facility: CLINIC | Age: 69
End: 2019-04-02
Payer: MEDICARE

## 2019-04-02 ENCOUNTER — NON-APPOINTMENT (OUTPATIENT)
Age: 69
End: 2019-04-02

## 2019-04-02 VITALS — BODY MASS INDEX: 23.63 KG/M2 | WEIGHT: 160 LBS | HEART RATE: 50 BPM | OXYGEN SATURATION: 100 %

## 2019-04-02 VITALS — DIASTOLIC BLOOD PRESSURE: 80 MMHG | SYSTOLIC BLOOD PRESSURE: 120 MMHG

## 2019-04-02 VITALS — SYSTOLIC BLOOD PRESSURE: 124 MMHG | DIASTOLIC BLOOD PRESSURE: 80 MMHG

## 2019-04-02 VITALS — DIASTOLIC BLOOD PRESSURE: 88 MMHG | SYSTOLIC BLOOD PRESSURE: 134 MMHG

## 2019-04-02 DIAGNOSIS — G47.33 OBSTRUCTIVE SLEEP APNEA (ADULT) (PEDIATRIC): ICD-10-CM

## 2019-04-02 PROCEDURE — 93040 RHYTHM ECG WITH REPORT: CPT | Mod: 59

## 2019-04-02 PROCEDURE — 93000 ELECTROCARDIOGRAM COMPLETE: CPT

## 2019-04-02 PROCEDURE — 99214 OFFICE O/P EST MOD 30 MIN: CPT | Mod: 25

## 2019-04-02 PROCEDURE — 93306 TTE W/DOPPLER COMPLETE: CPT

## 2019-04-02 NOTE — HISTORY OF PRESENT ILLNESS
[FreeTextEntry1] : His fingers are cold and turn white in cold weather - he has had this all his life.\par His wife notes that he snores and has periods where he stops breathing.\par He is looking forward to his first trip to [place] this summer.

## 2019-04-10 ENCOUNTER — MESSAGE (OUTPATIENT)
Age: 69
End: 2019-04-10

## 2019-05-02 ENCOUNTER — APPOINTMENT (OUTPATIENT)
Dept: INFECTIOUS DISEASE | Facility: HOSPITAL | Age: 69
End: 2019-05-02
Payer: SELF-PAY

## 2019-05-02 PROCEDURE — 99401 PREV MED CNSL INDIV APPRX 15: CPT | Mod: 25

## 2019-05-02 PROCEDURE — 90717 YELLOW FEVER VACCINE SUBQ: CPT

## 2019-05-02 PROCEDURE — 90632 HEPA VACCINE ADULT IM: CPT

## 2019-05-02 PROCEDURE — 90471 IMMUNIZATION ADMIN: CPT | Mod: NC

## 2019-05-02 PROCEDURE — 90472 IMMUNIZATION ADMIN EACH ADD: CPT | Mod: NC

## 2019-05-06 ENCOUNTER — FORM ENCOUNTER (OUTPATIENT)
Age: 69
End: 2019-05-06

## 2019-05-06 ENCOUNTER — APPOINTMENT (OUTPATIENT)
Dept: ENDOCRINOLOGY | Facility: CLINIC | Age: 69
End: 2019-05-06
Payer: MEDICARE

## 2019-05-06 VITALS
HEIGHT: 69 IN | SYSTOLIC BLOOD PRESSURE: 110 MMHG | DIASTOLIC BLOOD PRESSURE: 80 MMHG | HEART RATE: 61 BPM | WEIGHT: 158 LBS | OXYGEN SATURATION: 98 % | BODY MASS INDEX: 23.4 KG/M2

## 2019-05-06 PROCEDURE — 99214 OFFICE O/P EST MOD 30 MIN: CPT

## 2019-05-06 NOTE — HISTORY OF PRESENT ILLNESS
[FreeTextEntry1] : Mr. TANNA NASH is 69 year old male here to follow up for type 2 DM, osteopenia. \par \par He has been diagnosed with Type 2 DM since around 2000.  He had been on medication for about 10 years.  He has no known retinopathy, just saw eye doctor in Jan 2019.  \par He is up to date with podiatry, has ingrown toe nails, no neuropathy.  \par He reports no diabetic nephropathy. (EGFR 76), microalbumin slightly elevated, patient is on ACEi. \par \par He follows with cardiologist.  He has no prior history of macrovascular disease such as CAD, CHF or CVA. \par \par He is currently taking Lipitor at the recommendation of his cardiologist/pcp.   \par \par He checks his blood sugars every day in the morning.  \par \par Glucose numbers are usually 105 mg/dl this morning, sometimes it goes up to 120mg/dl.  \par He is tolerating metformin extended release 750 mg 2 tablets. (1500mg daily total). \par \par In regards to his diet, his family reports that he had lost some weight. On chart, lost about 6 lbs since Sept 2018.  He reports he is eating less because recent viral gastroenteritis.  \par \par Regarding osteopenia, patient has severe kyphosis march 2017 spine -2.3 wrist 0.2 fem neck -1.9 nad is on boniva tolerating well\par \par He's going on a trip to [place] this summer. \par

## 2019-05-06 NOTE — RESULTS/DATA
[Hologic] : hologic [L1 - L4] : L1 - L4 [Z-Score ___] : Z-score: [unfilled] [BMD ___ g/cm2] : BMD: [unfilled] g/cm2 [T-Score ___] : T-score: [unfilled] [FreeTextEntry2] : Feb 4, 2019 [de-identified] : [place]1.7, [place] [id number]  [de-identified] : 2017 0.672 -1.9   [de-identified] : 2017 0.820, -1.4 -0.028**

## 2019-05-06 NOTE — PHYSICAL EXAM
[Alert] : alert [Well Nourished] : well nourished [No Acute Distress] : no acute distress [Normal Sclera/Conjunctiva] : normal sclera/conjunctiva [Well Developed] : well developed [EOMI] : extra ocular movement intact [No Proptosis] : no proptosis [Normal Oropharynx] : the oropharynx was normal [Thyroid Not Enlarged] : the thyroid was not enlarged [No Thyroid Nodules] : there were no palpable thyroid nodules [No Respiratory Distress] : no respiratory distress [No Accessory Muscle Use] : no accessory muscle use [Clear to Auscultation] : lungs were clear to auscultation bilaterally [Normal Rate] : heart rate was normal  [Normal S1, S2] : normal S1 and S2 [Pedal Pulses Normal] : the pedal pulses are present [Regular Rhythm] : with a regular rhythm [No Edema] : there was no peripheral edema [Normal Bowel Sounds] : normal bowel sounds [Soft] : abdomen soft [Not Tender] : non-tender [Post Cervical Nodes] : posterior cervical nodes [Not Distended] : not distended [Anterior Cervical Nodes] : anterior cervical nodes [No Spinal Tenderness] : no spinal tenderness [Spine Straight] : spine straight [No Stigmata of Cushings Syndrome] : no stigmata of cushings syndrome [Normal Strength/Tone] : muscle strength and tone were normal [Normal Gait] : normal gait [Normal] : normal [Acanthosis Nigricans] : no acanthosis nigricans [Diminished Throughout Both Feet] : normal tactile sensation with monofilament testing throughout both feet [Normal Reflexes] : deep tendon reflexes were 2+ and symmetric [Oriented x3] : oriented to person, place, and time [No Tremors] : no tremors [de-identified] : left index finger nevus lesion, right abdominal small red rasied lesion about 3x5mm, also in posterior back [FreeTextEntry2] : onychomycosis  [FreeTextEntry6] : ingrown toe nail

## 2019-05-06 NOTE — ASSESSMENT
[Diabetes Foot Care] : diabetes foot care [Carbohydrate Consistent Diet] : carbohydrate consistent diet [FreeTextEntry1] : Mr. TANNA NASH is 68 year old male with well controlled Type 2 DM, HTN, HLD, osteopenia here for follow up visit.  \par \par 1. Type 2 DM\par Well controlled, A1C 6.4% in Dec 2018.  \par Continue with metformin ER 750mg daily \par Will check Vitamin B 12 level next visit (just had blood work in Dec 2018)\par Check microalbumin \par Continue with Carb. consistent diet and exercise. \par \par 2. HLD\par Stable, continue with lipitor 20mg daily \par \par 3. HTN\par Continue with Ramipril 2.5mg daily \par BP goal 110/80 \par \par 4.Ostopenia at multiple sites\par Continue Ibandronate 150mg once a month\par DXA stable and improved in 2018.  \par \par Diabetes Health Care Maintenance\par - Opthalmology up to date: seeing in June 2019\par -Podiatry up to date: Last seen 2 months ago.  \par -Antiplatelet agent: Yes\par  -Urine microalbumin: Check 01/28/2019 \par -ACE-I/ARB: Yes\par -Patient to call for persistent glucose < 70 or > 300\par -Discussed hypoglycemic protocol.  \par -Yearly flu vaccine recommended \par -Vitamin B12 level if on metformin (check next visit)\par -Lipid panel: up to date \par -Statin therapy: yes\par \par EDUCATION: Reviewed 'ABC' of diabetes management (respective goals in parentheses): A1C (<7), blood pressure (<140/90), and cholesterol (LDL <100).\par \par COMPLIANCE at present is estimated to be:  Good \par FOLLOW UP: I recommend that frequency of visits for diabetes care be every 6 month \par   [Long Term Vascular Complications] : long term vascular complications of diabetes

## 2019-05-06 NOTE — REVIEW OF SYSTEMS
[de-identified] : right abdominal bullous lesion, also in back.  left index finger lesion (black nevus/melona like) --> asked patient to speak with Derm [Negative] : Heme/Lymph

## 2019-05-07 ENCOUNTER — OUTPATIENT (OUTPATIENT)
Dept: OUTPATIENT SERVICES | Facility: HOSPITAL | Age: 69
LOS: 1 days | End: 2019-05-07
Payer: COMMERCIAL

## 2019-05-07 ENCOUNTER — APPOINTMENT (OUTPATIENT)
Dept: MRI IMAGING | Facility: CLINIC | Age: 69
End: 2019-05-07
Payer: MEDICARE

## 2019-05-07 DIAGNOSIS — I51.9 HEART DISEASE, UNSPECIFIED: ICD-10-CM

## 2019-05-07 DIAGNOSIS — I51.7 CARDIOMEGALY: ICD-10-CM

## 2019-05-07 LAB
ANION GAP SERPL CALC-SCNC: 13 MMOL/L
BUN SERPL-MCNC: 24 MG/DL
CALCIUM SERPL-MCNC: 9.3 MG/DL
CHLORIDE SERPL-SCNC: 102 MMOL/L
CO2 SERPL-SCNC: 25 MMOL/L
CREAT SERPL-MCNC: 1.09 MG/DL
ESTIMATED AVERAGE GLUCOSE: 148 MG/DL
GLUCOSE SERPL-MCNC: 115 MG/DL
HBA1C MFR BLD HPLC: 6.8 %
POTASSIUM SERPL-SCNC: 4.7 MMOL/L
SODIUM SERPL-SCNC: 140 MMOL/L
VIT B12 SERPL-MCNC: 732 PG/ML

## 2019-05-07 PROCEDURE — 75565 CARD MRI VELOC FLOW MAPPING: CPT | Mod: 26

## 2019-05-07 PROCEDURE — 75561 CARDIAC MRI FOR MORPH W/DYE: CPT

## 2019-05-07 PROCEDURE — 75565 CARD MRI VELOC FLOW MAPPING: CPT

## 2019-05-07 PROCEDURE — 75561 CARDIAC MRI FOR MORPH W/DYE: CPT | Mod: 26

## 2019-05-07 PROCEDURE — A9585: CPT

## 2019-05-13 ENCOUNTER — APPOINTMENT (OUTPATIENT)
Dept: ORTHOPEDIC SURGERY | Facility: CLINIC | Age: 69
End: 2019-05-13
Payer: MEDICARE

## 2019-05-13 VITALS — BODY MASS INDEX: 23.4 KG/M2 | WEIGHT: 158 LBS | HEIGHT: 69 IN

## 2019-05-13 DIAGNOSIS — M19.012 PRIMARY OSTEOARTHRITIS, LEFT SHOULDER: ICD-10-CM

## 2019-05-13 PROCEDURE — 99213 OFFICE O/P EST LOW 20 MIN: CPT

## 2019-05-16 ENCOUNTER — APPOINTMENT (OUTPATIENT)
Dept: CARDIOLOGY | Facility: CLINIC | Age: 69
End: 2019-05-16
Payer: MEDICARE

## 2019-05-16 ENCOUNTER — NON-APPOINTMENT (OUTPATIENT)
Age: 69
End: 2019-05-16

## 2019-05-16 VITALS
OXYGEN SATURATION: 97 % | SYSTOLIC BLOOD PRESSURE: 100 MMHG | BODY MASS INDEX: 23.92 KG/M2 | WEIGHT: 162 LBS | HEART RATE: 57 BPM | DIASTOLIC BLOOD PRESSURE: 60 MMHG

## 2019-05-16 PROCEDURE — 93000 ELECTROCARDIOGRAM COMPLETE: CPT

## 2019-05-16 PROCEDURE — 93040 RHYTHM ECG WITH REPORT: CPT | Mod: 59

## 2019-05-16 PROCEDURE — 99214 OFFICE O/P EST MOD 30 MIN: CPT

## 2019-05-18 NOTE — HISTORY OF PRESENT ILLNESS
[FreeTextEntry1] : On higher dose ramipril.\par He denies chest pain, shortness of breath, and palpitations.\par

## 2019-05-20 ENCOUNTER — MEDICATION RENEWAL (OUTPATIENT)
Age: 69
End: 2019-05-20

## 2019-06-11 ENCOUNTER — RX RENEWAL (OUTPATIENT)
Age: 69
End: 2019-06-11

## 2019-08-15 ENCOUNTER — APPOINTMENT (OUTPATIENT)
Dept: PULMONOLOGY | Facility: CLINIC | Age: 69
End: 2019-08-15
Payer: MEDICARE

## 2019-08-15 VITALS
BODY MASS INDEX: 23.7 KG/M2 | HEART RATE: 41 BPM | RESPIRATION RATE: 15 BRPM | TEMPERATURE: 97.5 F | WEIGHT: 160 LBS | HEIGHT: 69 IN | SYSTOLIC BLOOD PRESSURE: 127 MMHG | DIASTOLIC BLOOD PRESSURE: 76 MMHG | OXYGEN SATURATION: 98 %

## 2019-08-15 DIAGNOSIS — G47.33 OBSTRUCTIVE SLEEP APNEA (ADULT) (PEDIATRIC): ICD-10-CM

## 2019-08-15 PROCEDURE — 99205 OFFICE O/P NEW HI 60 MIN: CPT | Mod: GC

## 2019-08-15 NOTE — HISTORY OF PRESENT ILLNESS
[Obstructive Sleep Apnea] : obstructive sleep apnea [Snoring] : snoring [Witnessed Apneas] : witnessed sleep apnea [Frequent Nocturnal Awakening] : frequent nocturnal awakening [ESS: ___] : ESS score [unfilled] [Awakes Unrefreshed] : awakening unrefreshed [Awakening With Dry Mouth] : awakening with dry mouth [FreeTextEntry1] : 68 y/o M with PMH of HTN, HLD, DM, RV dysfunction presented to the sleep clinic for initial evaluation.\par \par Of note, patient had a screening ECHO on 04/2018 which showed e/o TR with repeat serial ECHO one year later showing RV dysfunction. Patient was referred to the sleep clinic for evaluation of sleep apnea.\par \par He goes to bed at 1am and has no issues with sleep initiation. Endorses 2-3 awakenings in the evening needing to void. (+) snoring and awakens gasping for air.  Wakes up at 6-7am and awakens unrefreshed.  Minimal symptoms of EDS with a ESS of 6 today in office.\par \par He is a retired salesman. Non-smoker and no exposures reported. No limitations in ADLs.  Runs 2-3 miles.

## 2019-08-15 NOTE — CONSULT LETTER
[Dear  ___] : Dear  [unfilled], [Consult Letter:] : I had the pleasure of evaluating your patient, [unfilled]. [Please see my note below.] : Please see my note below. [Consult Closing:] : Thank you very much for allowing me to participate in the care of this patient.  If you have any questions, please do not hesitate to contact me. [Sincerely,] : Sincerely, [FreeTextEntry3] : Kye Richard MD, FAASM, FCCP\par Medical Director\par [facility] [place]

## 2019-08-15 NOTE — REVIEW OF SYSTEMS
[EDS: ESS=____] : daytime somnolence: ESS=[unfilled] [Snoring] : snoring [Witnessed Apneas] : witnessed apnea [Nocturia] : nocturia [Negative] : Psychiatric

## 2019-08-15 NOTE — ASSESSMENT
[FreeTextEntry1] : 70 y/o M with PMH of HTN, HLD, DM, RV dysfunction presented to the sleep clinic for initial evaluation.  Patient had an ECHO performed on 04/2019 which showed e/o RV dysfunction and was referred to the sleep clinic for evaluation of possible sleep apnea. He endorses snoring and multiple night time awakenings.  Dry mouth and feels unrefreshed in the AM. His ESS was 6 in the office today.  He has comorbid cardiac disease including a h/o RV dysfunction,HTN, DM, snoring and nonrestorative sleep. 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 Assessment and treatment of MARIANA is important for management of somnolence and nonrestorative sleep as well as for cardiovascular risk reduction.  The patient was referred to the [facility] for diagnostic polysomnography for further assessment. The ramifications of obstructive sleep apnea and its potential therapeutic modalities were discussed with the patient. The dangers of drowsy driving were discussed with the patient.  The patient was warned to avoid drowsy driving. The patient will followup after results of this study are available. Given history of possible parenchymal disease and possible RV dysfunction, will check a baseline set of PFTs. Clinically patient is CTA and no e/o desaturation.\par \par Moy Hinson DO\par Sleep Fellow

## 2019-08-15 NOTE — PHYSICAL EXAM
[General Appearance - Well Developed] : well developed [Normal Appearance] : normal appearance [Well Groomed] : well groomed [General Appearance - Well Nourished] : well nourished [No Deformities] : no deformities [Normal Conjunctiva] : the conjunctiva exhibited no abnormalities [General Appearance - In No Acute Distress] : no acute distress [Eyelids - No Xanthelasma] : the eyelids demonstrated no xanthelasmas [Low Lying Soft Palate] : low lying soft palate [Neck Appearance] : the appearance of the neck was normal [Neck Cervical Mass (___cm)] : no neck mass was observed [Jugular Venous Distention Increased] : there was no jugular-venous distention [Thyroid Diffuse Enlargement] : the thyroid was not enlarged [Thyroid Nodule] : there were no palpable thyroid nodules [Heart Rate And Rhythm] : heart rate was normal and rhythm regular [Heart Sounds] : normal S1 and S2 [Heart Sounds Gallop] : no gallops [Murmurs] : no murmurs [Heart Sounds Pericardial Friction Rub] : no pericardial rub [Abnormal Walk] : normal gait [Auscultation Breath Sounds / Voice Sounds] : lungs were clear to auscultation bilaterally [Musculoskeletal - Swelling] : no joint swelling seen [Motor Tone] : muscle strength and tone were normal [Nail Clubbing] : no clubbing of the fingernails [Cyanosis, Localized] : no localized cyanosis [Petechial Hemorrhages (___cm)] : no petechial hemorrhages [Skin Color & Pigmentation] : normal skin color and pigmentation [Skin Turgor] : normal skin turgor [Deep Tendon Reflexes (DTR)] : deep tendon reflexes were 2+ and symmetric [] : no rash [No Focal Deficits] : no focal deficits [Sensation] : the sensory exam was normal to light touch and pinprick [Impaired Insight] : insight and judgment were intact [Oriented To Time, Place, And Person] : oriented to person, place, and time [Affect] : the affect was normal

## 2019-08-19 ENCOUNTER — APPOINTMENT (OUTPATIENT)
Dept: PULMONOLOGY | Facility: CLINIC | Age: 69
End: 2019-08-19
Payer: MEDICARE

## 2019-08-19 PROCEDURE — 94729 DIFFUSING CAPACITY: CPT

## 2019-08-19 PROCEDURE — 94726 PLETHYSMOGRAPHY LUNG VOLUMES: CPT

## 2019-08-19 PROCEDURE — 94060 EVALUATION OF WHEEZING: CPT

## 2019-08-20 ENCOUNTER — APPOINTMENT (OUTPATIENT)
Dept: CARDIOLOGY | Facility: CLINIC | Age: 69
End: 2019-08-20
Payer: MEDICARE

## 2019-08-20 ENCOUNTER — NON-APPOINTMENT (OUTPATIENT)
Age: 69
End: 2019-08-20

## 2019-08-20 VITALS
HEART RATE: 51 BPM | WEIGHT: 156 LBS | SYSTOLIC BLOOD PRESSURE: 110 MMHG | BODY MASS INDEX: 23.04 KG/M2 | OXYGEN SATURATION: 99 % | DIASTOLIC BLOOD PRESSURE: 78 MMHG

## 2019-08-20 PROCEDURE — 93000 ELECTROCARDIOGRAM COMPLETE: CPT

## 2019-08-20 PROCEDURE — 93040 RHYTHM ECG WITH REPORT: CPT | Mod: 59

## 2019-08-20 PROCEDURE — 99214 OFFICE O/P EST MOD 30 MIN: CPT

## 2019-08-20 NOTE — HISTORY OF PRESENT ILLNESS
[FreeTextEntry1] : He denies chest pain, shortness of breath, and palpitations.\par He is back to a lower dose of ramipril (1.25 q.d.).

## 2019-09-10 ENCOUNTER — RX RENEWAL (OUTPATIENT)
Age: 69
End: 2019-09-10

## 2019-10-21 ENCOUNTER — APPOINTMENT (OUTPATIENT)
Dept: SLEEP CENTER | Facility: CLINIC | Age: 69
End: 2019-10-21
Payer: MEDICARE

## 2019-10-21 ENCOUNTER — OUTPATIENT (OUTPATIENT)
Dept: OUTPATIENT SERVICES | Facility: HOSPITAL | Age: 69
LOS: 1 days | End: 2019-10-21
Payer: MEDICARE

## 2019-10-21 PROCEDURE — 95810 POLYSOM 6/> YRS 4/> PARAM: CPT | Mod: 26

## 2019-10-21 PROCEDURE — 95810 POLYSOM 6/> YRS 4/> PARAM: CPT

## 2019-10-22 DIAGNOSIS — G47.33 OBSTRUCTIVE SLEEP APNEA (ADULT) (PEDIATRIC): ICD-10-CM

## 2019-11-08 ENCOUNTER — APPOINTMENT (OUTPATIENT)
Dept: ENDOCRINOLOGY | Facility: CLINIC | Age: 69
End: 2019-11-08
Payer: MEDICARE

## 2019-11-08 VITALS
OXYGEN SATURATION: 98 % | HEIGHT: 69 IN | SYSTOLIC BLOOD PRESSURE: 116 MMHG | DIASTOLIC BLOOD PRESSURE: 80 MMHG | BODY MASS INDEX: 23.55 KG/M2 | WEIGHT: 159 LBS | HEART RATE: 55 BPM

## 2019-11-08 LAB
GLUCOSE BLDC GLUCOMTR-MCNC: 153
HBA1C MFR BLD HPLC: 6.4

## 2019-11-08 PROCEDURE — 99214 OFFICE O/P EST MOD 30 MIN: CPT

## 2019-11-08 NOTE — HISTORY OF PRESENT ILLNESS
[___] : [unfilled] [FreeTextEntry1] : Mr. TANNA NASH is 69 year old male here to follow up for type 2 DM, osteopenia. \par \par He has been diagnosed with Type 2 DM since around 2000.  He had been on medication for about 10 years.  He has no known retinopathy, he is up to date with eye doctor.  \par He is up to date with podiatry, has ingrown toe nails, no neuropathy.  \par He reports no diabetic nephropathy. (EGFR 76), microalbumin slightly elevated, patient is on ACEi. \par \par He follows with cardiologist.  He has no prior history of macrovascular disease such as CAD, CHF or CVA. \par \par He is currently taking Lipitor at the recommendation of his cardiologist/pcp.   \par \par May 6, 2019: Hemoglobin A1c 6.8%, creatinine 1.09, EGFR 68, vitamin B12 732\par January 2019 microalbumin/creatinine ratio 40.0 mg/G.\par \par He checks his blood sugars every day in the morning.  \par \par Glucose numbers are usually 105 mg/dl this morning, sometimes it goes up to 120mg/dl.  He is tolerating metformin extended release 750 mg 2 tablets. (1500mg daily total). \par \par Regarding osteopenia, patient has severe kyphosis march 2017 spine -2.3 wrist 0.2 fem neck -1.9 nad is on Boniva tolerating well\par

## 2019-11-08 NOTE — ASSESSMENT
[FreeTextEntry1] : Mr. TANNA NASH is 69 year old male with well controlled Type 2 DM, HTN, HLD, osteopenia here for follow up visit.  \par \par #1. Type 2 DM\par Well controlled, A1C 6.4% in Dec 2018.  \par Continue with metformin ER 750mg 2 tablets once daily (1500mg daily total).  \par Check microalbumin next visit. Due Feb 2020.  \par Continue with Carb. consistent diet and exercise. \par aug 2019 eye doctor.  \par \par #2. HLD\par Stable, continue with lipitor 20mg daily \par LDL in Dec 2018 51 mg/dL. \par Will be following up with cardiology in a couple of weeks.  \par \par #3. /80\par Continue with Ramipril 1.25 mg daily, this dosage was recently decreased in May 2019.  \par \par # 4.Osteopenia at multiple sites\par Continue Ibandronate 150mg once a month, no side effects.  \par DXA stable and improved, repeat in Feb 2021. \par \par Diabetes Health Care Maintenance\par - Opthalmology up to date: Seen in June 2019\par -Podiatry up to date: Last seen 2 months ago, goes regularly.    \par -Antiplatelet agent: Yes\par  -Urine microalbumin: Check 01/28/2019 \par -ACE-I/ARB: Yes\par -Patient to call for persistent glucose < 70 or > 300\par -Discussed hypoglycemic protocol.  \par -Yearly flu vaccine recommended \par -Vitamin B12 level if on metformin normal in May 2019.  \par -Lipid panel: up to date \par -Statin therapy: yes\par \par EDUCATION: Reviewed 'ABC' of diabetes management (respective goals in parentheses): A1C (<7), blood pressure (<140/90), and cholesterol (LDL <100).\par \par COMPLIANCE at present is estimated to be:  Good \par FOLLOW UP: I recommend that frequency of visits for diabetes care be every 6 month \par   [Diabetes Foot Care] : diabetes foot care [Carbohydrate Consistent Diet] : carbohydrate consistent diet [Importance of Diet and Exercise] : importance of diet and exercise to improve glycemic control, achieve weight loss and improve cardiovascular health [Long Term Vascular Complications] : long term vascular complications of diabetes [Self Monitoring of Blood Glucose] : self monitoring of blood glucose [Retinopathy Screening] : Patient was referred to ophthalmology for retinopathy screening

## 2019-11-08 NOTE — PHYSICAL EXAM
[Alert] : alert [No Acute Distress] : no acute distress [Well Developed] : well developed [Well Nourished] : well nourished [EOMI] : extra ocular movement intact [Normal Sclera/Conjunctiva] : normal sclera/conjunctiva [No Proptosis] : no proptosis [Normal Oropharynx] : the oropharynx was normal [Thyroid Not Enlarged] : the thyroid was not enlarged [No Thyroid Nodules] : there were no palpable thyroid nodules [No Respiratory Distress] : no respiratory distress [No Accessory Muscle Use] : no accessory muscle use [Normal S1, S2] : normal S1 and S2 [Normal Rate] : heart rate was normal  [Clear to Auscultation] : lungs were clear to auscultation bilaterally [Regular Rhythm] : with a regular rhythm [Pedal Pulses Normal] : the pedal pulses are present [No Edema] : there was no peripheral edema [Normal Bowel Sounds] : normal bowel sounds [Not Distended] : not distended [Soft] : abdomen soft [Not Tender] : non-tender [No Spinal Tenderness] : no spinal tenderness [Anterior Cervical Nodes] : anterior cervical nodes [Post Cervical Nodes] : posterior cervical nodes [No Stigmata of Cushings Syndrome] : no stigmata of cushings syndrome [Spine Straight] : spine straight [Normal Strength/Tone] : muscle strength and tone were normal [Normal Gait] : normal gait [Acanthosis Nigricans] : no acanthosis nigricans [Normal] : normal [Diminished Throughout Both Feet] : normal tactile sensation with monofilament testing throughout both feet [No Tremors] : no tremors [Normal Reflexes] : deep tendon reflexes were 2+ and symmetric [de-identified] : left index finger nevus lesion, right abdominal small red rasied lesion about 3x5mm, also in posterior back [Oriented x3] : oriented to person, place, and time [FreeTextEntry2] : onychomycosis  [FreeTextEntry6] : ingrown toe nail

## 2019-11-12 ENCOUNTER — RESULT REVIEW (OUTPATIENT)
Age: 69
End: 2019-11-12

## 2019-11-26 ENCOUNTER — APPOINTMENT (OUTPATIENT)
Dept: CARDIOLOGY | Facility: CLINIC | Age: 69
End: 2019-11-26
Payer: MEDICARE

## 2019-11-26 ENCOUNTER — NON-APPOINTMENT (OUTPATIENT)
Age: 69
End: 2019-11-26

## 2019-11-26 VITALS
WEIGHT: 159 LBS | HEART RATE: 64 BPM | BODY MASS INDEX: 23.48 KG/M2 | SYSTOLIC BLOOD PRESSURE: 100 MMHG | DIASTOLIC BLOOD PRESSURE: 60 MMHG | OXYGEN SATURATION: 99 %

## 2019-11-26 VITALS — SYSTOLIC BLOOD PRESSURE: 110 MMHG | DIASTOLIC BLOOD PRESSURE: 72 MMHG

## 2019-11-26 PROCEDURE — 99214 OFFICE O/P EST MOD 30 MIN: CPT

## 2019-11-26 PROCEDURE — 93000 ELECTROCARDIOGRAM COMPLETE: CPT

## 2019-11-26 NOTE — HISTORY OF PRESENT ILLNESS
[FreeTextEntry1] : He denies chest pain, shortness of breath, and palpitations.\par Living on [address]par

## 2020-03-09 ENCOUNTER — APPOINTMENT (OUTPATIENT)
Dept: ENDOCRINOLOGY | Facility: CLINIC | Age: 70
End: 2020-03-09
Payer: MEDICARE

## 2020-03-09 VITALS
WEIGHT: 158 LBS | BODY MASS INDEX: 23.4 KG/M2 | SYSTOLIC BLOOD PRESSURE: 120 MMHG | DIASTOLIC BLOOD PRESSURE: 80 MMHG | HEIGHT: 69 IN

## 2020-03-09 LAB — GLUCOSE BLDC GLUCOMTR-MCNC: 106

## 2020-03-09 PROCEDURE — 99214 OFFICE O/P EST MOD 30 MIN: CPT

## 2020-03-09 NOTE — REASON FOR VISIT
[Follow-Up: _____] : a [unfilled] follow-up visit [FreeTextEntry1] : Type 2 Diabetes Mellitus, Hypertension, Hyperlipidemia, Osteopenia

## 2020-03-09 NOTE — HISTORY OF PRESENT ILLNESS
[FreeTextEntry1] : Mr. TANNA NASH is 70 year old male here to follow up for type 2 DM, osteopenia. \par \par He has been diagnosed with Type 2 DM since around 2000.  He was controlled with diet and exercise until around 2014, when his A1c was greater than 6.5% that he was started on metformin before therapy.  He has been stable on the metformin doses for the last 6 years.  Currently taking 1500 mg once daily.  His A1c has been in the range of 6.4 to 6.8% the greatest.  Today, March 9, 2019, it was 6.6%.\par \par Patient is up to date with his ophthalmologist, he last saw him in February 2020.  There is no diabetic retinopathy.  He is up-to-date with his podiatrist.  He goes every few months.  His next appointment is in April 20 of 2020.  There is no diabetic neuropathy.  He has ingrown toenails.\par \par He reports no diabetic nephropathy.  EGFR 68 in May 2019.  He is on an ACE inhibitor due to mildly elevated microalbumin.  Microalbumin/creatinine ratio 40 in January 2019.  He is on an ACE inhibitor.\par \par He follows with cardiologist.  He has no prior history of macrovascular disease such as CAD, CHF or CVA. \par \par He is currently taking Lipitor at the recommendation of his cardiologist/pcp.   \par \par May 6, 2019: Hemoglobin A1c 6.8%, creatinine 1.09, EGFR 68, vitamin B12 732\par January 2019 microalbumin/creatinine ratio 40.0 mg/G.\par \par He checks his blood sugars every day in the morning.  \par \par Glucose numbers are usually 105 mg/dl this morning, sometimes it goes up to 120mg/dl.  He is tolerating metformin extended release 750 mg 2 tablets. (1500mg daily total). \par \par Regarding osteopenia, patient has severe kyphosis March 2017 spine -2.3 wrist 0.2 fem neck -1.9 nad is on Boniva tolerating well\par

## 2020-03-09 NOTE — ASSESSMENT
[FreeTextEntry1] : Mr. TANNA NASH is 69 year old male with well controlled Type 2 DM, HTN, HLD, osteopenia here for follow up visit.  \par \par #1. Type 2 DM, A1c today March 9, 2019 was 6.6%.\par Recommend to continue with metformin 1500 daily.  Patient takes 750 mg twice daily.\par Recommend to check glucose once or twice daily.\par Recommend annual podiatry and ophthalmology visit.\par \par #2. HLD\par Continue with Lipitor 20 mg once daily, check lipid profile.\par Will be following up with cardiology in a couple of weeks.  \par \par #3. /80\par Continue with Ramipril 1.25 mg daily, this dosage was recently decreased in May 2019.  \par Recommend to check microalbumin/creatinine ratio today.\par Check comprehensive metabolic panel.\par \par # 4.Osteopenia at multiple sites\par Continue Ibandronate 150mg once a month, no side effects.  \par DXA stable and improved, repeat in Feb 2021. \par Discussion about falls prevention was provided.\par \par Diabetes Health Care Maintenance\par - Opthalmology up to date: Seen in June 2019\par -Podiatry up to date: Last seen 2 months ago, goes regularly.    \par -Antiplatelet agent: Yes\par  -Urine microalbumin: Check 01/28/2019 \par -ACE-I/ARB: Yes\par -Patient to call for persistent glucose < 70 or > 300\par -Discussed hypoglycemic protocol.  \par -Yearly flu vaccine recommended \par -Vitamin B12 level if on metformin normal in May 2019.  \par -Lipid panel: up to date \par -Statin therapy: yes\par \par EDUCATION: Reviewed 'ABC' of diabetes management (respective goals in parentheses): A1C (<7), blood pressure (<140/90), and cholesterol (LDL <100).\par \par COMPLIANCE at present is estimated to be:  Good \par FOLLOW UP: I recommend that frequency of visits for diabetes care be every 6 month \par   [Carbohydrate Consistent Diet] : carbohydrate consistent diet [Hypoglycemia Management] : hypoglycemia management [Glucagon Use] : glucagon use [Diabetes Foot Care] : diabetes foot care [Long Term Vascular Complications] : long term vascular complications of diabetes [Self Monitoring of Blood Glucose] : self monitoring of blood glucose [Injection Technique, Storage, Sharps Disposal] : injection technique, storage, and sharps disposal [Retinopathy Screening] : Patient was referred to ophthalmology for retinopathy screening [Bisphosphonate Therapy] : Risks  and benefits of bisphosphonate therapy were  discussed with the patient including gastroesophageal irritation, osteonecrosis of the jaw, and atypical femur fractures, and acute phase reaction

## 2020-03-09 NOTE — PHYSICAL EXAM
[Alert] : alert [No Acute Distress] : no acute distress [Well Nourished] : well nourished [Well Developed] : well developed [Normal Sclera/Conjunctiva] : normal sclera/conjunctiva [EOMI] : extra ocular movement intact [No Proptosis] : no proptosis [Normal Oropharynx] : the oropharynx was normal [Thyroid Not Enlarged] : the thyroid was not enlarged [No Thyroid Nodules] : there were no palpable thyroid nodules [No Respiratory Distress] : no respiratory distress [No Accessory Muscle Use] : no accessory muscle use [Clear to Auscultation] : lungs were clear to auscultation bilaterally [Normal Rate] : heart rate was normal  [Normal S1, S2] : normal S1 and S2 [Regular Rhythm] : with a regular rhythm [Pedal Pulses Normal] : the pedal pulses are present [No Edema] : there was no peripheral edema [Normal Bowel Sounds] : normal bowel sounds [Not Tender] : non-tender [Soft] : abdomen soft [Not Distended] : not distended [Post Cervical Nodes] : posterior cervical nodes [Anterior Cervical Nodes] : anterior cervical nodes [Kyphosis] : kyphosis present [Scoliosis] : scoliosis not present [No Stigmata of Cushings Syndrome] : no stigmata of cushings syndrome [Normal Gait] : normal gait [Normal Strength/Tone] : muscle strength and tone were normal [Acanthosis Nigricans] : no acanthosis nigricans [Normal] : normal [Diminished Throughout Both Feet] : normal tactile sensation with monofilament testing throughout both feet [Normal Reflexes] : deep tendon reflexes were 2+ and symmetric [No Tremors] : no tremors [Oriented x3] : oriented to person, place, and time [de-identified] : left index finger nevus lesion, right abdominal small red rasied lesion about 3x5mm, also in posterior back [FreeTextEntry2] : onychomycosis  [FreeTextEntry6] : ingrown toe nail

## 2020-03-16 LAB
CREAT SPEC-SCNC: 96 MG/DL
HBA1C MFR BLD HPLC: 6.6
MICROALBUMIN 24H UR DL<=1MG/L-MCNC: 3.8 MG/DL
MICROALBUMIN/CREAT 24H UR-RTO: 40 MG/G

## 2020-03-26 ENCOUNTER — APPOINTMENT (OUTPATIENT)
Dept: CARDIOLOGY | Facility: CLINIC | Age: 70
End: 2020-03-26

## 2020-04-27 ENCOUNTER — APPOINTMENT (OUTPATIENT)
Dept: CARDIOLOGY | Facility: CLINIC | Age: 70
End: 2020-04-27
Payer: MEDICARE

## 2020-04-27 PROCEDURE — 99214 OFFICE O/P EST MOD 30 MIN: CPT | Mod: 95

## 2020-04-27 RX ORDER — OSELTAMIVIR PHOSPHATE 75 MG/1
75 CAPSULE ORAL DAILY
Qty: 10 | Refills: 0 | Status: DISCONTINUED | COMMUNITY
Start: 2020-01-24 | End: 2020-04-27

## 2020-04-27 RX ORDER — AZITHROMYCIN 250 MG/1
250 TABLET, FILM COATED ORAL
Qty: 4 | Refills: 0 | Status: DISCONTINUED | COMMUNITY
Start: 2019-06-26 | End: 2020-04-27

## 2020-04-27 RX ORDER — TRIAMCINOLONE ACETONIDE 1 MG/G
0.1 CREAM TOPICAL
Refills: 0 | Status: DISCONTINUED | COMMUNITY
End: 2020-04-27

## 2020-04-27 RX ORDER — ATOVAQUONE AND PROGUANIL HYDROCHLORIDE 250; 100 MG/1; MG/1
250-100 TABLET, FILM COATED ORAL DAILY
Qty: 30 | Refills: 0 | Status: DISCONTINUED | COMMUNITY
Start: 2019-05-02 | End: 2020-04-27

## 2020-04-27 NOTE — HISTORY OF PRESENT ILLNESS
[FreeTextEntry1] : INTERVAL HiSTORY: \par Hypertension: He is on ramipril.  He is watching his diet and watching his salt intake.  His blood pressure today was 109/68.\par Diabetes mellitus.  His most recent hemoglobin A1c was 6.6 on 3/9/2020.  He is watching his diet.\par Mitral regurgitation: He denies shortness of breath.  His mitral regurgitation was moderate in severity at the time of his last echo in April 2019; he had a follow-up echo scheduled recently, which he canceled due to COVID-19.\par \par REVIEW OF SYSTEMS:\par Cardiac - denies chest pain, shortness of breath, palpitations, and dizziness\par GI - denies abdominal pain, nausea, vomiting, and diarrhea\par \par PERTINENT PMH:\par Positive for hypercholesterolemia: He is on atorvastatin, which he is tolerating well.  His last lipid panel was December 2018.  He is watching his diet.\par \par \par

## 2020-04-27 NOTE — REASON FOR VISIT
[Follow-Up - Clinic] : a clinic follow-up of [Hyperlipidemia] : hyperlipidemia [Hypertension] : hypertension [Mitral Regurgitation] : mitral regurgitation [FreeTextEntry1] : TeleHealth Video Encounter\par Initiated by: Patient election for TeleHealth visit\par Patient was consented for TeleHealth visit\par Patient Location: Home ([phone])\par Physician Location: Home\par

## 2020-07-09 ENCOUNTER — RX RENEWAL (OUTPATIENT)
Age: 70
End: 2020-07-09

## 2020-07-28 ENCOUNTER — RX RENEWAL (OUTPATIENT)
Age: 70
End: 2020-07-28

## 2020-08-17 ENCOUNTER — APPOINTMENT (OUTPATIENT)
Dept: ENDOCRINOLOGY | Facility: CLINIC | Age: 70
End: 2020-08-17
Payer: MEDICARE

## 2020-08-17 VITALS
WEIGHT: 150 LBS | SYSTOLIC BLOOD PRESSURE: 110 MMHG | BODY MASS INDEX: 22.22 KG/M2 | TEMPERATURE: 97 F | HEIGHT: 69 IN | DIASTOLIC BLOOD PRESSURE: 70 MMHG

## 2020-08-17 LAB
GLUCOSE BLDC GLUCOMTR-MCNC: 105
HBA1C MFR BLD HPLC: 6.2

## 2020-08-17 PROCEDURE — 99214 OFFICE O/P EST MOD 30 MIN: CPT

## 2020-08-17 RX ORDER — LEVOFLOXACIN 500 MG/1
500 TABLET, FILM COATED ORAL
Qty: 6 | Refills: 0 | Status: COMPLETED | COMMUNITY
Start: 2020-07-01

## 2020-08-17 NOTE — PHYSICAL EXAM
[Alert] : alert [Well Nourished] : well nourished [No Acute Distress] : no acute distress [EOMI] : extra ocular movement intact [Well Developed] : well developed [Normal Sclera/Conjunctiva] : normal sclera/conjunctiva [Normal Oropharynx] : the oropharynx was normal [No Proptosis] : no proptosis [No Accessory Muscle Use] : no accessory muscle use [No Respiratory Distress] : no respiratory distress [No Thyroid Nodules] : no palpable thyroid nodules [Thyroid Not Enlarged] : the thyroid was not enlarged [Clear to Auscultation] : lungs were clear to auscultation bilaterally [Normal Rate] : heart rate was normal [Normal S1, S2] : normal S1 and S2 [Pedal Pulses Normal] : the pedal pulses are present [No Edema] : no peripheral edema [Regular Rhythm] : with a regular rhythm [Soft] : abdomen soft [Not Distended] : not distended [Not Tender] : non-tender [Normal Bowel Sounds] : normal bowel sounds [Normal Anterior Cervical Nodes] : no anterior cervical lymphadenopathy [Normal Posterior Cervical Nodes] : no posterior cervical lymphadenopathy [No Spinal Tenderness] : no spinal tenderness [No Stigmata of Cushings Syndrome] : no stigmata of Cushings Syndrome [Spine Straight] : spine straight [Normal Gait] : normal gait [No Rash] : no rash [Acanthosis Nigricans] : no acanthosis nigricans [Normal Strength/Tone] : muscle strength and tone were normal [Full ROM] : with full range of motion [Normal] : normal [Normal Reflexes] : deep tendon reflexes were 2+ and symmetric [Diminished Throughout Both Feet] : normal tactile sensation with monofilament testing throughout both feet [Oriented x3] : oriented to person, place, and time [No Tremors] : no tremors [FreeTextEntry1] : Onychomycosis on the first big toe

## 2020-08-17 NOTE — HISTORY OF PRESENT ILLNESS
[FreeTextEntry1] : Mr. TANNA NASH is 70 year old male here to follow up for type 2 DM, osteopenia. \par \par He has been diagnosed with Type 2 DM since around 2000.  He was controlled with diet and exercise until around 2014, when his A1c was greater than 6.5% that he was started on metformin before therapy.  He has been stable on the metformin doses for the last 6 years.  Currently taking 1500 mg once daily.  His A1c has been in the range of 6.4 to 6.8% the greatest.  \par \par Patient is up to date with his ophthalmologist, he last saw him in February 2020.  There is no diabetic retinopathy.  He has glaucoma therefore he goes to his ophthalmologist on a regular basis.  He has not been to his podiatrist for a while.  Has fungus on the first toe on the right side.  He used to go every few months but currently on hold due to the corona virus pandemic.\par \par He reports no diabetic nephropathy.  EGFR 68 in May 2019.  He is on an ACE inhibitor due to mildly elevated microalbumin.  Microalbumin/creatinine ratio 40 in January 2019.  He is on an ACE inhibitor.  Microalbumin in March 2020 was within normal limits.\par \par He follows with cardiologist.  He has no prior history of macrovascular disease such as CAD, CHF or CVA. \par \par He is currently taking Lipitor at the recommendation of his cardiologist/pcp.   \par \par May 6, 2019: Hemoglobin A1c 6.8%, creatinine 1.09, EGFR 68, vitamin B12 732\par January 2019 microalbumin/creatinine ratio 40.0 mg/G.\par \par He checks his blood sugars every day in the morning.  \par \par Glucose numbers are usually 105 mg/dl this morning, sometimes it goes up to 120mg/dl.  He is tolerating metformin extended release 750 mg 2 tablets. (1500mg daily total). EGFR in March 2020 was 68 ml/min/1.73. \par \par Regarding osteopenia, patient has severe kyphosis March 2017 spine -2.3 wrist 0.2 fem neck -1.9 nad is on Boniva tolerating well\par

## 2020-08-17 NOTE — ASSESSMENT
[FreeTextEntry1] : Mr. TANNA NASH is 69 year old male with well controlled Type 2 DM, HTN, HLD, osteopenia here for follow up visit.  \par \par #1. Type 2 DM, A1c today March 9, 2019 was 6.6%.\par Recommend to continue with metformin 750 mg extended release twice daily.\par Recommend to check glucose once or twice daily.\par Patient follows up with ophthalmologist once a year.  Patient follows up with podiatrist regularly.\par \par #2. HLD\par Continue with Lipitor 20 mg once daily, check lipid profile next visit\par Will be following up with cardiology in a couple of weeks.  \par \par #3. /80\par Continue with Ramipril 1.25 mg daily, this dosage was recently decreased in May 2019.  \par Recommend to check microalbumin/creatinine ratio today.\par Check comprehensive metabolic panel.\par \par # 4.Osteopenia at multiple sites\par Continue Ibandronate 150mg once a month, no side effects.  \par DXA stable and improved, repeat in Feb 2021. \par Discussion about falls prevention was provided.\par \par EDUCATION: Reviewed 'ABC' of diabetes management (respective goals in parentheses): A1C (<7), blood pressure (<140/90), and cholesterol (LDL <100).\par \par COMPLIANCE at present is estimated to be:  Good \par FOLLOW UP: I recommend that frequency of visits for diabetes care be every 6 month \par

## 2020-08-17 NOTE — RESULTS/DATA
[Hologic] : hologic [L1 - L4] : L1 - L4 [BMD ___ g/cm2] : BMD: [unfilled] g/cm2 [T-Score ___] : T-score: [unfilled] [Z-Score ___] : Z-score: [unfilled] [FreeTextEntry2] : Feb 2019 [de-identified] : -1.4 in 2017.   [FreeTextEntry1] : He has been on treatment with Boniva for about 4-5 years.

## 2020-08-18 ENCOUNTER — NON-APPOINTMENT (OUTPATIENT)
Age: 70
End: 2020-08-18

## 2020-08-18 ENCOUNTER — APPOINTMENT (OUTPATIENT)
Dept: CARDIOLOGY | Facility: CLINIC | Age: 70
End: 2020-08-18
Payer: MEDICARE

## 2020-08-18 ENCOUNTER — LABORATORY RESULT (OUTPATIENT)
Age: 70
End: 2020-08-18

## 2020-08-18 VITALS
TEMPERATURE: 97 F | BODY MASS INDEX: 22.74 KG/M2 | WEIGHT: 154 LBS | HEART RATE: 52 BPM | OXYGEN SATURATION: 100 % | SYSTOLIC BLOOD PRESSURE: 100 MMHG | DIASTOLIC BLOOD PRESSURE: 60 MMHG

## 2020-08-18 VITALS — SYSTOLIC BLOOD PRESSURE: 96 MMHG | DIASTOLIC BLOOD PRESSURE: 70 MMHG

## 2020-08-18 DIAGNOSIS — Z00.00 ENCOUNTER FOR GENERAL ADULT MEDICAL EXAMINATION W/OUT ABNORMAL FINDINGS: ICD-10-CM

## 2020-08-18 PROCEDURE — 93000 ELECTROCARDIOGRAM COMPLETE: CPT

## 2020-08-18 PROCEDURE — 99214 OFFICE O/P EST MOD 30 MIN: CPT

## 2020-08-18 PROCEDURE — 93040 RHYTHM ECG WITH REPORT: CPT | Mod: 59

## 2020-08-18 PROCEDURE — 36415 COLL VENOUS BLD VENIPUNCTURE: CPT

## 2020-08-18 RX ORDER — RAMIPRIL 1.25 MG/1
1.25 CAPSULE ORAL DAILY
Qty: 90 | Refills: 2 | Status: DISCONTINUED | COMMUNITY
Start: 2018-03-15 | End: 2020-08-18

## 2020-08-18 NOTE — HISTORY OF PRESENT ILLNESS
[FreeTextEntry1] : He denies chest pain, shortness of breath, and palpitations.\par He has occasional dizziness if he gets up too quickly.\par [place].

## 2020-08-21 ENCOUNTER — NON-APPOINTMENT (OUTPATIENT)
Age: 70
End: 2020-08-21

## 2020-08-29 LAB
ALBUMIN SERPL ELPH-MCNC: 4.6 G/DL
ALP BLD-CCNC: 62 U/L
ALT SERPL-CCNC: 21 U/L
ANION GAP SERPL CALC-SCNC: 10 MMOL/L
AST SERPL-CCNC: 24 U/L
BASOPHILS # BLD AUTO: 0.05 K/UL
BASOPHILS NFR BLD AUTO: 1.1 %
BILIRUB SERPL-MCNC: 1.2 MG/DL
BUN SERPL-MCNC: 16 MG/DL
CALCIUM SERPL-MCNC: 9.4 MG/DL
CHLORIDE SERPL-SCNC: 102 MMOL/L
CHOLEST SERPL-MCNC: 123 MG/DL
CHOLEST/HDLC SERPL: 1.6 RATIO
CO2 SERPL-SCNC: 28 MMOL/L
CREAT SERPL-MCNC: 0.98 MG/DL
EOSINOPHIL # BLD AUTO: 0.18 K/UL
EOSINOPHIL NFR BLD AUTO: 3.8 %
GLUCOSE SERPL-MCNC: 142 MG/DL
HCT VFR BLD CALC: 41.9 %
HDLC SERPL-MCNC: 78 MG/DL
HGB BLD-MCNC: 13.5 G/DL
IMM GRANULOCYTES NFR BLD AUTO: 0.2 %
LDLC SERPL CALC-MCNC: 35 MG/DL
LDLC SERPL DIRECT ASSAY-MCNC: 47 MG/DL
LYMPHOCYTES # BLD AUTO: 1.14 K/UL
LYMPHOCYTES NFR BLD AUTO: 24.2 %
MAGNESIUM SERPL-MCNC: 2 MG/DL
MAN DIFF?: NORMAL
MCHC RBC-ENTMCNC: 30.1 PG
MCHC RBC-ENTMCNC: 32.2 GM/DL
MCV RBC AUTO: 93.5 FL
MONOCYTES # BLD AUTO: 0.46 K/UL
MONOCYTES NFR BLD AUTO: 9.8 %
NEUTROPHILS # BLD AUTO: 2.87 K/UL
NEUTROPHILS NFR BLD AUTO: 60.9 %
PLATELET # BLD AUTO: 174 K/UL
POTASSIUM SERPL-SCNC: 4.4 MMOL/L
PROT SERPL-MCNC: 7 G/DL
RBC # BLD: 4.48 M/UL
RBC # FLD: 14.6 %
SARS-COV-2 IGG SERPL IA-ACNC: <0.1 INDEX
SARS-COV-2 IGG SERPL QL IA: NEGATIVE
SODIUM SERPL-SCNC: 140 MMOL/L
T3 SERPL-MCNC: 78 NG/DL
T3RU NFR SERPL: 1 TBI
T4 FREE SERPL-MCNC: 1.3 NG/DL
T4 SERPL-MCNC: 5.7 UG/DL
TRIGL SERPL-MCNC: 50 MG/DL
TSH SERPL-ACNC: 1.88 UIU/ML
WBC # FLD AUTO: 4.71 K/UL

## 2020-09-29 ENCOUNTER — NON-APPOINTMENT (OUTPATIENT)
Age: 70
End: 2020-09-29

## 2020-09-29 ENCOUNTER — APPOINTMENT (OUTPATIENT)
Dept: CARDIOLOGY | Facility: CLINIC | Age: 70
End: 2020-09-29
Payer: MEDICARE

## 2020-09-29 VITALS
WEIGHT: 150 LBS | BODY MASS INDEX: 22.22 KG/M2 | DIASTOLIC BLOOD PRESSURE: 74 MMHG | HEART RATE: 55 BPM | TEMPERATURE: 97.3 F | SYSTOLIC BLOOD PRESSURE: 124 MMHG | OXYGEN SATURATION: 100 % | HEIGHT: 69 IN

## 2020-09-29 VITALS — SYSTOLIC BLOOD PRESSURE: 122 MMHG | DIASTOLIC BLOOD PRESSURE: 80 MMHG

## 2020-09-29 DIAGNOSIS — Z23 ENCOUNTER FOR IMMUNIZATION: ICD-10-CM

## 2020-09-29 DIAGNOSIS — R63.4 ABNORMAL WEIGHT LOSS: ICD-10-CM

## 2020-09-29 PROCEDURE — 90662 IIV NO PRSV INCREASED AG IM: CPT

## 2020-09-29 PROCEDURE — 93040 RHYTHM ECG WITH REPORT: CPT | Mod: 59

## 2020-09-29 PROCEDURE — 93000 ELECTROCARDIOGRAM COMPLETE: CPT

## 2020-09-29 PROCEDURE — 93306 TTE W/DOPPLER COMPLETE: CPT

## 2020-09-29 PROCEDURE — 99214 OFFICE O/P EST MOD 30 MIN: CPT

## 2020-09-29 PROCEDURE — G0008: CPT

## 2020-09-30 ENCOUNTER — RX RENEWAL (OUTPATIENT)
Age: 70
End: 2020-09-30

## 2020-10-28 ENCOUNTER — RX RENEWAL (OUTPATIENT)
Age: 70
End: 2020-10-28

## 2021-01-05 ENCOUNTER — NON-APPOINTMENT (OUTPATIENT)
Age: 71
End: 2021-01-05

## 2021-01-05 ENCOUNTER — APPOINTMENT (OUTPATIENT)
Dept: CARDIOLOGY | Facility: CLINIC | Age: 71
End: 2021-01-05
Payer: MEDICARE

## 2021-01-05 VITALS
HEIGHT: 69 IN | TEMPERATURE: 97.1 F | HEART RATE: 45 BPM | DIASTOLIC BLOOD PRESSURE: 80 MMHG | SYSTOLIC BLOOD PRESSURE: 130 MMHG | BODY MASS INDEX: 23.7 KG/M2 | WEIGHT: 160 LBS | OXYGEN SATURATION: 100 %

## 2021-01-05 VITALS — HEART RATE: 43 BPM | DIASTOLIC BLOOD PRESSURE: 82 MMHG | SYSTOLIC BLOOD PRESSURE: 128 MMHG

## 2021-01-05 PROCEDURE — 99214 OFFICE O/P EST MOD 30 MIN: CPT

## 2021-01-05 PROCEDURE — 93000 ELECTROCARDIOGRAM COMPLETE: CPT

## 2021-01-05 PROCEDURE — 93040 RHYTHM ECG WITH REPORT: CPT | Mod: 59

## 2021-01-05 NOTE — HISTORY OF PRESENT ILLNESS
[FreeTextEntry1] : His systolic blood pressures at home typically are in the 130s, but occasionally spike to 150 mmHg. \par He denies chest pain, shortness of breath, and palpitations.\par He sees a urologist, Dr. Morales, in [place].

## 2021-01-24 ENCOUNTER — NON-APPOINTMENT (OUTPATIENT)
Age: 71
End: 2021-01-24

## 2021-02-19 ENCOUNTER — APPOINTMENT (OUTPATIENT)
Dept: ENDOCRINOLOGY | Facility: CLINIC | Age: 71
End: 2021-02-19
Payer: MEDICARE

## 2021-02-19 VITALS — DIASTOLIC BLOOD PRESSURE: 80 MMHG | HEART RATE: 60 BPM | SYSTOLIC BLOOD PRESSURE: 112 MMHG | OXYGEN SATURATION: 98 %

## 2021-02-19 VITALS — WEIGHT: 154 LBS | BODY MASS INDEX: 22.81 KG/M2 | TEMPERATURE: 97.7 F | HEIGHT: 69 IN

## 2021-02-19 LAB — HBA1C MFR BLD HPLC: 6.6

## 2021-02-19 PROCEDURE — 77080 DXA BONE DENSITY AXIAL: CPT | Mod: GA

## 2021-02-19 PROCEDURE — 99214 OFFICE O/P EST MOD 30 MIN: CPT | Mod: 25

## 2021-02-19 PROCEDURE — 83036 HEMOGLOBIN GLYCOSYLATED A1C: CPT | Mod: QW

## 2021-02-19 NOTE — RESULTS/DATA
[Hologic] : hologic [L1 - L4] : L1 - L4 [BMD ___ g/cm2] : BMD: [unfilled] g/cm2 [T-Score ___] : T-score: [unfilled] [Z-Score ___] : Z-score: [unfilled] [FreeTextEntry2] : 02/19/2021  [de-identified] : 2019 0.881, T-sore -1.6 [de-identified] : 0.836, T-score -1.3; [de-identified] : 0.693, T-score -1.7

## 2021-02-19 NOTE — ASSESSMENT
[Diabetes Foot Care] : diabetes foot care [FreeTextEntry1] : Mr. TANNA NASH is 70 year old male with well controlled Type 2 DM, HTN, HLD, osteopenia here for follow up visit.  \par \par #1. Type 2 DM, A1c in August 2020 was 6.2%, TODAY 02/19/2021 6.6% which is at goal. \par Recommend to continue with metformin 750 mg extended release twice daily.\par Recommend to check glucose once or twice daily.\par Patient follows up with ophthalmologist once a year.  Patient follows up with podiatrist regularly.\par \par #2. HLD\par Continue with Lipitor 20 mg once daily\par LDL was checked in August 20 2035 mg/dL.\par \par #3. HTN \par Continue with Ramipril 1.25 mg daily, this dosage was recently decreased in May 2019.  \par EGFR 70 7 August 2020\par Microalbumin/creatinine ratio 40 March 2020.\par \par # 4.Osteopenia at multiple sites\par Continue Ibandronate 150mg once a month, no side effects.  \par Bone density 02/19/2021 showed improvement and stabilization \par \par EDUCATION: Reviewed 'ABC' of diabetes management (respective goals in parentheses): A1C (<7), blood pressure (<140/90), and cholesterol (LDL <100).\par \par COMPLIANCE at present is estimated to be:  Good \par FOLLOW UP: I recommend that frequency of visits for diabetes care be every 6 month \par   [Long Term Vascular Complications] : long term vascular complications of diabetes [Carbohydrate Consistent Diet] : carbohydrate consistent diet [Importance of Diet and Exercise] : importance of diet and exercise to improve glycemic control, achieve weight loss and improve cardiovascular health [Exercise/Effect on Glucose] : exercise/effect on glucose [Hypoglycemia Management] : hypoglycemia management [Glucagon Use] : glucagon use [Ketone Testing] : ketone testing [Self Monitoring of Blood Glucose] : self monitoring of blood glucose [Insulin Self-Administration] : insulin self-administration [Sick-Day Management] : sick-day management [Retinopathy Screening] : Patient was referred to ophthalmology for retinopathy screening

## 2021-02-19 NOTE — HISTORY OF PRESENT ILLNESS
[FreeTextEntry1] : Mr. TANNA NASH is 70 year old male here to follow up for type 2 DM, osteopenia. \par \par He has been diagnosed with Type 2 DM since around 2000.  He was controlled with diet and exercise until around 2014, when his A1c was greater than 6.5% that he was started on metformin before therapy.  He has been stable on the metformin doses for the last 6 years.  Currently taking 1500 mg once daily.  His A1c has been in the range of 6.4 to 6.8% the greatest.  Today, 02/19/2021 A1C is 6.6%\par \par Patient is up to date with his ophthalmologist, he last saw him last week. There is no diabetic retinopathy.  He has glaucoma therefore he goes to his ophthalmologist on a regular basis.  He has not been to his podiatrist for a while.  Has fungus on the first toe on the right side. He hasn't been back after the pandemic. \par \par He reports no diabetic nephropathy.  EGFR 77 in Aug 2020.  He is on an ACE inhibitor due to mildly elevated microalbumin.  He is on an ACE inhibitor.  Microalbumin in March 2020 was within normal limits.\par \par He follows with cardiologist.  He has no prior history of macrovascular disease such as CAD, CHF or CVA. \par \par He is currently taking Lipitor at the recommendation of his cardiologist/pcp.   \par \par May 6, 2019: Hemoglobin A1c 6.8%, creatinine 1.09, EGFR 68, vitamin B12 732\par January 2019 microalbumin/creatinine ratio 40.0 mg/G.\par \par He checks his blood sugars every day in the morning.  \par \par Glucose numbers are usually 105 mg/dl this morning, sometimes it goes up to 120mg/dl.  He is tolerating metformin extended release 750 mg 2 tablets. (1500mg daily total). EGFR in Aug 2020 was 77 ml/min/1.73m2.\par \par Regarding osteopenia, patient has severe kyphosis March 2017 spine -2.3 wrist 0.2 fem neck -1.9 and is on Boniva tolerating well.

## 2021-02-19 NOTE — PHYSICAL EXAM
[Alert] : alert [Well Nourished] : well nourished [No Acute Distress] : no acute distress [Well Developed] : well developed [Normal Sclera/Conjunctiva] : normal sclera/conjunctiva [EOMI] : extra ocular movement intact [No Proptosis] : no proptosis [Normal Oropharynx] : the oropharynx was normal [Thyroid Not Enlarged] : the thyroid was not enlarged [No Thyroid Nodules] : no palpable thyroid nodules [No Respiratory Distress] : no respiratory distress [No Accessory Muscle Use] : no accessory muscle use [Clear to Auscultation] : lungs were clear to auscultation bilaterally [Normal S1, S2] : normal S1 and S2 [Normal Rate] : heart rate was normal [Regular Rhythm] : with a regular rhythm [No Edema] : no peripheral edema [Pedal Pulses Normal] : the pedal pulses are present [Normal Bowel Sounds] : normal bowel sounds [Not Tender] : non-tender [Not Distended] : not distended [Soft] : abdomen soft [Normal Anterior Cervical Nodes] : no anterior cervical lymphadenopathy [No Spinal Tenderness] : no spinal tenderness [Spine Straight] : spine straight [No Stigmata of Cushings Syndrome] : no stigmata of Cushings Syndrome [Normal Gait] : normal gait [Normal Strength/Tone] : muscle strength and tone were normal [No Rash] : no rash [Acanthosis Nigricans] : no acanthosis nigricans [Normal] : normal [Full ROM] : with full range of motion [Diminished Throughout Both Feet] : normal tactile sensation with monofilament testing throughout both feet [Normal Reflexes] : deep tendon reflexes were 2+ and symmetric [No Tremors] : no tremors [Oriented x3] : oriented to person, place, and time [FreeTextEntry1] : Onychomycosis on the first big toe

## 2021-02-22 ENCOUNTER — RX RENEWAL (OUTPATIENT)
Age: 71
End: 2021-02-22

## 2021-04-13 ENCOUNTER — APPOINTMENT (OUTPATIENT)
Dept: CARDIOLOGY | Facility: CLINIC | Age: 71
End: 2021-04-13
Payer: MEDICARE

## 2021-04-13 ENCOUNTER — NON-APPOINTMENT (OUTPATIENT)
Age: 71
End: 2021-04-13

## 2021-04-13 VITALS
BODY MASS INDEX: 23.63 KG/M2 | SYSTOLIC BLOOD PRESSURE: 108 MMHG | DIASTOLIC BLOOD PRESSURE: 74 MMHG | OXYGEN SATURATION: 98 % | WEIGHT: 160 LBS | HEART RATE: 53 BPM

## 2021-04-13 VITALS — SYSTOLIC BLOOD PRESSURE: 128 MMHG | DIASTOLIC BLOOD PRESSURE: 84 MMHG

## 2021-04-13 DIAGNOSIS — R31.9 HEMATURIA, UNSPECIFIED: ICD-10-CM

## 2021-04-13 PROCEDURE — 93000 ELECTROCARDIOGRAM COMPLETE: CPT

## 2021-04-13 PROCEDURE — 93040 RHYTHM ECG WITH REPORT: CPT | Mod: 59

## 2021-04-13 PROCEDURE — 99214 OFFICE O/P EST MOD 30 MIN: CPT

## 2021-04-13 PROCEDURE — 36415 COLL VENOUS BLD VENIPUNCTURE: CPT

## 2021-04-13 NOTE — HISTORY OF PRESENT ILLNESS
[FreeTextEntry1] : Blood pressures are 125-140/80s (systolics are most commonly in the 130s).\par He denies chest pain, shortness of breath, palpitations, and dizziness.\par He had hematuria on 4/1/2021 and 4/2/2021.

## 2021-04-18 ENCOUNTER — NON-APPOINTMENT (OUTPATIENT)
Age: 71
End: 2021-04-18

## 2021-04-18 LAB
ALBUMIN SERPL ELPH-MCNC: 4.4 G/DL
ALP BLD-CCNC: 61 U/L
ALT SERPL-CCNC: 18 U/L
ANION GAP SERPL CALC-SCNC: 10 MMOL/L
APPEARANCE: CLEAR
AST SERPL-CCNC: 21 U/L
BACTERIA UR CULT: NORMAL
BACTERIA: NEGATIVE
BASOPHILS # BLD AUTO: 0.05 K/UL
BASOPHILS NFR BLD AUTO: 1 %
BILIRUB SERPL-MCNC: 1.2 MG/DL
BILIRUBIN URINE: NEGATIVE
BLOOD URINE: NEGATIVE
BUN SERPL-MCNC: 22 MG/DL
CALCIUM SERPL-MCNC: 9.2 MG/DL
CHLORIDE SERPL-SCNC: 100 MMOL/L
CHOLEST SERPL-MCNC: 134 MG/DL
CO2 SERPL-SCNC: 28 MMOL/L
COLOR: YELLOW
CREAT SERPL-MCNC: 0.97 MG/DL
EOSINOPHIL # BLD AUTO: 0.14 K/UL
EOSINOPHIL NFR BLD AUTO: 2.7 %
ESTIMATED AVERAGE GLUCOSE: 148 MG/DL
GLUCOSE QUALITATIVE U: ABNORMAL
GLUCOSE SERPL-MCNC: 258 MG/DL
HBA1C MFR BLD HPLC: 6.8 %
HCT VFR BLD CALC: 42.2 %
HDLC SERPL-MCNC: 79 MG/DL
HGB BLD-MCNC: 13.5 G/DL
HYALINE CASTS: 1 /LPF
IMM GRANULOCYTES NFR BLD AUTO: 0.2 %
KETONES URINE: NEGATIVE
LDLC SERPL CALC-MCNC: 43 MG/DL
LDLC SERPL DIRECT ASSAY-MCNC: 48 MG/DL
LEUKOCYTE ESTERASE URINE: NEGATIVE
LYMPHOCYTES # BLD AUTO: 1.22 K/UL
LYMPHOCYTES NFR BLD AUTO: 23.9 %
MAN DIFF?: NORMAL
MCHC RBC-ENTMCNC: 30.1 PG
MCHC RBC-ENTMCNC: 32 GM/DL
MCV RBC AUTO: 94 FL
MICROSCOPIC-UA: NORMAL
MONOCYTES # BLD AUTO: 0.48 K/UL
MONOCYTES NFR BLD AUTO: 9.4 %
NEUTROPHILS # BLD AUTO: 3.2 K/UL
NEUTROPHILS NFR BLD AUTO: 62.8 %
NITRITE URINE: NEGATIVE
NONHDLC SERPL-MCNC: 55 MG/DL
PH URINE: 6
PLATELET # BLD AUTO: 177 K/UL
POTASSIUM SERPL-SCNC: 4.7 MMOL/L
PROT SERPL-MCNC: 6.9 G/DL
PROTEIN URINE: NEGATIVE
RBC # BLD: 4.49 M/UL
RBC # FLD: 15.1 %
RED BLOOD CELLS URINE: 1 /HPF
SODIUM SERPL-SCNC: 138 MMOL/L
SPECIFIC GRAVITY URINE: 1.02
SQUAMOUS EPITHELIAL CELLS: 1 /HPF
TRIGL SERPL-MCNC: 59 MG/DL
UROBILINOGEN URINE: NORMAL
WBC # FLD AUTO: 5.1 K/UL
WHITE BLOOD CELLS URINE: 2 /HPF

## 2021-08-03 ENCOUNTER — NON-APPOINTMENT (OUTPATIENT)
Age: 71
End: 2021-08-03

## 2021-08-03 ENCOUNTER — RX RENEWAL (OUTPATIENT)
Age: 71
End: 2021-08-03

## 2021-08-03 ENCOUNTER — APPOINTMENT (OUTPATIENT)
Dept: CARDIOLOGY | Facility: CLINIC | Age: 71
End: 2021-08-03
Payer: MEDICARE

## 2021-08-03 VITALS
OXYGEN SATURATION: 100 % | SYSTOLIC BLOOD PRESSURE: 110 MMHG | WEIGHT: 158 LBS | HEART RATE: 49 BPM | BODY MASS INDEX: 23.33 KG/M2 | DIASTOLIC BLOOD PRESSURE: 80 MMHG

## 2021-08-03 PROCEDURE — 93040 RHYTHM ECG WITH REPORT: CPT | Mod: 59

## 2021-08-03 PROCEDURE — 99214 OFFICE O/P EST MOD 30 MIN: CPT

## 2021-08-03 PROCEDURE — 93000 ELECTROCARDIOGRAM COMPLETE: CPT

## 2021-08-03 NOTE — HISTORY OF PRESENT ILLNESS
[FreeTextEntry1] : He denies chest pain, shortness of breath, and palpitations.\par /74 recently, 105/63 at urologist.\par Gets PSAs with urologist.\par He denies chest pain, shortness of breath, and palpitations.\par \par

## 2021-08-23 ENCOUNTER — APPOINTMENT (OUTPATIENT)
Dept: ENDOCRINOLOGY | Facility: CLINIC | Age: 71
End: 2021-08-23
Payer: MEDICARE

## 2021-08-23 VITALS
SYSTOLIC BLOOD PRESSURE: 120 MMHG | WEIGHT: 158 LBS | HEART RATE: 53 BPM | BODY MASS INDEX: 23.4 KG/M2 | DIASTOLIC BLOOD PRESSURE: 90 MMHG | TEMPERATURE: 97.9 F | OXYGEN SATURATION: 98 % | HEIGHT: 69 IN

## 2021-08-23 PROCEDURE — 82962 GLUCOSE BLOOD TEST: CPT

## 2021-08-23 PROCEDURE — 83036 HEMOGLOBIN GLYCOSYLATED A1C: CPT | Mod: QW

## 2021-08-23 PROCEDURE — 99214 OFFICE O/P EST MOD 30 MIN: CPT

## 2021-08-23 NOTE — PHYSICAL EXAM
[Alert] : alert [Well Nourished] : well nourished [No Acute Distress] : no acute distress [Well Developed] : well developed [Normal Sclera/Conjunctiva] : normal sclera/conjunctiva [EOMI] : extra ocular movement intact [No Proptosis] : no proptosis [Normal Oropharynx] : the oropharynx was normal [Thyroid Not Enlarged] : the thyroid was not enlarged [No Thyroid Nodules] : no palpable thyroid nodules [No Respiratory Distress] : no respiratory distress [No Accessory Muscle Use] : no accessory muscle use [Clear to Auscultation] : lungs were clear to auscultation bilaterally [Normal S1, S2] : normal S1 and S2 [Normal Rate] : heart rate was normal [Regular Rhythm] : with a regular rhythm [No Edema] : no peripheral edema [Pedal Pulses Normal] : the pedal pulses are present [Normal Bowel Sounds] : normal bowel sounds [Not Tender] : non-tender [Not Distended] : not distended [Normal Anterior Cervical Nodes] : no anterior cervical lymphadenopathy [Soft] : abdomen soft [No Spinal Tenderness] : no spinal tenderness [Spine Straight] : spine straight [No Stigmata of Cushings Syndrome] : no stigmata of Cushings Syndrome [Normal Gait] : normal gait [Normal Strength/Tone] : muscle strength and tone were normal [No Rash] : no rash [Normal] : normal [Full ROM] : with full range of motion [Normal Reflexes] : deep tendon reflexes were 2+ and symmetric [No Tremors] : no tremors [Oriented x3] : oriented to person, place, and time [Acanthosis Nigricans] : no acanthosis nigricans [Diminished Throughout Both Feet] : normal tactile sensation with monofilament testing throughout both feet [FreeTextEntry1] : Onychomycosis on the first big toe

## 2021-08-23 NOTE — ASSESSMENT
[FreeTextEntry1] : Mr. TANNA NASH is 71 year old male with well controlled Type 2 DM, HTN, HLD, osteopenia here for follow up visit.  \par \par 1. Type 2 DM\par A1C: 6.8% 4/18/2021\par A1C: 6.6% 08/23/2021 \par He checks glucose once a while.  Sometimes fasting in the morning 120s'.  \par He states that he got rid a lot of carbohydrates. \par Recommend to continue with metformin 750 mg extended release twice daily.\par Recommend to check glucose once or twice daily.\par Patient follows up with ophthalmologist once a year.  \par He hasn't seen the foot doctor for a couple of years.  Foot exam 08/23/2021 within normal limits.  \par \par 2. HLD\par LDL 43mg/dl 4/13/2021\par Continue with Lipitor 20 mg once daily\par \par 3. HTN \par BP today: 120/90\par eGFR 78 mL/min/1.73M2 4/13/2021\par Creatinine 0.77 mg/dL 4/13/2021\par Rampirl was d/c due to hypotension.    \par Check microalbumin/creatinine level today \par  \par 4. Osteopenia of multiple sites\par Continue Ibandronate 150mg once a month, no side effects.  \par Bone density 02/19/2021 showed improvement and stabilization \par \par EDUCATION: Reviewed 'ABC' of diabetes management (respective goals in parentheses): A1C (<7), blood pressure (<140/90), and cholesterol (LDL <100).\par \par COMPLIANCE at present is estimated to be:  Good \par FOLLOW UP: I recommend that frequency of visits for diabetes care be every 6 month \par

## 2021-08-26 LAB
CREAT SPEC-SCNC: 109 MG/DL
GLUCOSE BLDC GLUCOMTR-MCNC: 127
HBA1C MFR BLD HPLC: 6.6
MICROALBUMIN 24H UR DL<=1MG/L-MCNC: 4.7 MG/DL
MICROALBUMIN/CREAT 24H UR-RTO: 43 MG/G

## 2021-08-28 ENCOUNTER — NON-APPOINTMENT (OUTPATIENT)
Age: 71
End: 2021-08-28

## 2021-09-09 ENCOUNTER — APPOINTMENT (OUTPATIENT)
Dept: CARDIOLOGY | Facility: CLINIC | Age: 71
End: 2021-09-09
Payer: MEDICARE

## 2021-09-09 ENCOUNTER — NON-APPOINTMENT (OUTPATIENT)
Age: 71
End: 2021-09-09

## 2021-09-09 VITALS
HEART RATE: 56 BPM | OXYGEN SATURATION: 97 % | BODY MASS INDEX: 23.04 KG/M2 | DIASTOLIC BLOOD PRESSURE: 80 MMHG | SYSTOLIC BLOOD PRESSURE: 112 MMHG | WEIGHT: 156 LBS

## 2021-09-09 DIAGNOSIS — Z87.898 PERSONAL HISTORY OF OTHER SPECIFIED CONDITIONS: ICD-10-CM

## 2021-09-09 DIAGNOSIS — Z87.09 PERSONAL HISTORY OF OTHER DISEASES OF THE RESPIRATORY SYSTEM: ICD-10-CM

## 2021-09-09 DIAGNOSIS — R03.1 NONSPECIFIC LOW BLOOD-PRESSURE READING: ICD-10-CM

## 2021-09-09 PROCEDURE — 93000 ELECTROCARDIOGRAM COMPLETE: CPT

## 2021-09-09 PROCEDURE — 93040 RHYTHM ECG WITH REPORT: CPT | Mod: 59

## 2021-09-09 PROCEDURE — 99214 OFFICE O/P EST MOD 30 MIN: CPT

## 2021-09-09 PROCEDURE — 36415 COLL VENOUS BLD VENIPUNCTURE: CPT

## 2021-09-12 LAB
ALBUMIN SERPL ELPH-MCNC: 4.4 G/DL
ALP BLD-CCNC: 68 U/L
ALT SERPL-CCNC: 20 U/L
ANION GAP SERPL CALC-SCNC: 14 MMOL/L
APPEARANCE: ABNORMAL
AST SERPL-CCNC: 30 U/L
BACTERIA UR CULT: NORMAL
BACTERIA: NEGATIVE
BASOPHILS # BLD AUTO: 0.04 K/UL
BASOPHILS NFR BLD AUTO: 0.7 %
BILIRUB SERPL-MCNC: 1 MG/DL
BILIRUBIN URINE: NEGATIVE
BLOOD URINE: NEGATIVE
BUN SERPL-MCNC: 22 MG/DL
CALCIUM OXALATE CRYSTALS: ABNORMAL
CALCIUM SERPL-MCNC: 9.6 MG/DL
CHLORIDE SERPL-SCNC: 101 MMOL/L
CO2 SERPL-SCNC: 23 MMOL/L
COLOR: ABNORMAL
CREAT SERPL-MCNC: 1.07 MG/DL
EOSINOPHIL # BLD AUTO: 0.14 K/UL
EOSINOPHIL NFR BLD AUTO: 2.5 %
GLUCOSE QUALITATIVE U: NEGATIVE
GLUCOSE SERPL-MCNC: 100 MG/DL
HCT VFR BLD CALC: 40.5 %
HGB BLD-MCNC: 13.4 G/DL
HYALINE CASTS: 0 /LPF
IMM GRANULOCYTES NFR BLD AUTO: 0.2 %
KETONES URINE: NEGATIVE
LEUKOCYTE ESTERASE URINE: NEGATIVE
LYMPHOCYTES # BLD AUTO: 1.35 K/UL
LYMPHOCYTES NFR BLD AUTO: 24.3 %
MAN DIFF?: NORMAL
MCHC RBC-ENTMCNC: 30.7 PG
MCHC RBC-ENTMCNC: 33.1 GM/DL
MCV RBC AUTO: 92.9 FL
MICROSCOPIC-UA: NORMAL
MONOCYTES # BLD AUTO: 0.75 K/UL
MONOCYTES NFR BLD AUTO: 13.5 %
NEUTROPHILS # BLD AUTO: 3.27 K/UL
NEUTROPHILS NFR BLD AUTO: 58.8 %
NITRITE URINE: NEGATIVE
PH URINE: 5.5
PLATELET # BLD AUTO: 190 K/UL
POTASSIUM SERPL-SCNC: 4.4 MMOL/L
PROT SERPL-MCNC: 7.1 G/DL
PROTEIN URINE: ABNORMAL
RBC # BLD: 4.36 M/UL
RBC # FLD: 14.8 %
RED BLOOD CELLS URINE: 2 /HPF
SODIUM SERPL-SCNC: 138 MMOL/L
SPECIFIC GRAVITY URINE: 1.03
SQUAMOUS EPITHELIAL CELLS: 3 /HPF
UROBILINOGEN URINE: NORMAL
WBC # FLD AUTO: 5.56 K/UL
WHITE BLOOD CELLS URINE: 4 /HPF

## 2021-09-14 ENCOUNTER — NON-APPOINTMENT (OUTPATIENT)
Age: 71
End: 2021-09-14

## 2021-09-19 NOTE — CARDIOLOGY SUMMARY
[de-identified] : Stress echocardiogram (4/23/18): Negative for ischemia. [de-identified] : (9/29/2020): LVEF approx 65%. Concentric LV remodeling. Mild LV diastolic dysfunction. Mildly decreased RV systolic function.  Right atrial enlargement.  Mitral valve prolapse.  Mild MR.  Mild-moderate TR.

## 2021-09-19 NOTE — HISTORY OF PRESENT ILLNESS
[FreeTextEntry1] : He presents for medical clearance for a transurethral laser prostatectomy (Dr. Jensen Morales, [facility], 7/23/2021).\par He denies chest pain, shortness of breath, palpitations, and dizziness.\par

## 2021-12-06 ENCOUNTER — APPOINTMENT (OUTPATIENT)
Dept: CARDIOLOGY | Facility: CLINIC | Age: 71
End: 2021-12-06
Payer: MEDICARE

## 2021-12-06 ENCOUNTER — NON-APPOINTMENT (OUTPATIENT)
Age: 71
End: 2021-12-06

## 2021-12-06 VITALS
WEIGHT: 154 LBS | BODY MASS INDEX: 22.74 KG/M2 | SYSTOLIC BLOOD PRESSURE: 120 MMHG | HEART RATE: 52 BPM | DIASTOLIC BLOOD PRESSURE: 80 MMHG | OXYGEN SATURATION: 100 %

## 2021-12-06 VITALS — DIASTOLIC BLOOD PRESSURE: 86 MMHG | SYSTOLIC BLOOD PRESSURE: 124 MMHG

## 2021-12-06 DIAGNOSIS — I34.0 NONRHEUMATIC MITRAL (VALVE) INSUFFICIENCY: ICD-10-CM

## 2021-12-06 PROCEDURE — 93040 RHYTHM ECG WITH REPORT: CPT | Mod: 59

## 2021-12-06 PROCEDURE — 99214 OFFICE O/P EST MOD 30 MIN: CPT

## 2021-12-06 PROCEDURE — 93000 ELECTROCARDIOGRAM COMPLETE: CPT

## 2021-12-06 PROCEDURE — 93306 TTE W/DOPPLER COMPLETE: CPT

## 2021-12-06 NOTE — HISTORY OF PRESENT ILLNESS
[FreeTextEntry1] : 8/3/2021 - Office visit:\par He denies chest pain, shortness of breath, and palpitations.\par /74 recently, 105/63 at urologist.\par Gets PSAs with urologist.\par He denies chest pain, shortness of breath, and palpitations.\par \par 12/06/2021 - Office visit:\par S/P laser TURP.\par He denies chest pain, shortness of breath, palpitations, and dizziness.\par \par \par

## 2021-12-12 ENCOUNTER — NON-APPOINTMENT (OUTPATIENT)
Age: 71
End: 2021-12-12

## 2022-01-29 ENCOUNTER — RX RENEWAL (OUTPATIENT)
Age: 72
End: 2022-01-29

## 2022-03-01 ENCOUNTER — APPOINTMENT (OUTPATIENT)
Dept: ENDOCRINOLOGY | Facility: CLINIC | Age: 72
End: 2022-03-01
Payer: MEDICARE

## 2022-03-01 VITALS
DIASTOLIC BLOOD PRESSURE: 90 MMHG | SYSTOLIC BLOOD PRESSURE: 140 MMHG | HEART RATE: 55 BPM | TEMPERATURE: 97.4 F | BODY MASS INDEX: 23.18 KG/M2 | OXYGEN SATURATION: 97 % | WEIGHT: 157 LBS

## 2022-03-01 PROCEDURE — 82962 GLUCOSE BLOOD TEST: CPT

## 2022-03-01 PROCEDURE — 83036 HEMOGLOBIN GLYCOSYLATED A1C: CPT | Mod: QW

## 2022-03-01 PROCEDURE — 99214 OFFICE O/P EST MOD 30 MIN: CPT | Mod: 25

## 2022-03-01 RX ORDER — LANCETS 33 GAUGE
EACH MISCELLANEOUS
Qty: 4 | Refills: 3 | Status: DISCONTINUED | COMMUNITY
Start: 2018-01-09 | End: 2022-03-01

## 2022-03-01 RX ORDER — BLOOD-GLUCOSE CONTROL, NORMAL
EACH MISCELLANEOUS
Qty: 1 | Refills: 3 | Status: DISCONTINUED | COMMUNITY
Start: 2018-01-09 | End: 2022-03-01

## 2022-03-01 NOTE — RESULTS/DATA
[FreeTextEntry1] : Bone density 2/19/2021\par L1-L2: BMD 0.893, T score -1.5, Z score -0.5\par (2019: BMD 0.81, T score -1.6, 1.4% increase)\par Total hip: BMD 0.856, T score -1.2, Z score -0.4\par (2019: BMD 0.836, T score -1.3, 2.4% increase)\par Femoral neck: BMD 0.78, T score -1.6, Z score 0.3\par (2019, BMD 0.693, T score -1.7, 3.6% increase)

## 2022-03-01 NOTE — PHYSICAL EXAM
[Alert] : alert [Well Nourished] : well nourished [No Acute Distress] : no acute distress [Well Developed] : well developed [Normal Sclera/Conjunctiva] : normal sclera/conjunctiva [EOMI] : extra ocular movement intact [No Proptosis] : no proptosis [Normal Oropharynx] : the oropharynx was normal [Thyroid Not Enlarged] : the thyroid was not enlarged [No Thyroid Nodules] : no palpable thyroid nodules [No Respiratory Distress] : no respiratory distress [No Accessory Muscle Use] : no accessory muscle use [Clear to Auscultation] : lungs were clear to auscultation bilaterally [Normal S1, S2] : normal S1 and S2 [Normal Rate] : heart rate was normal [Regular Rhythm] : with a regular rhythm [No Edema] : no peripheral edema [Pedal Pulses Normal] : the pedal pulses are present [Normal Bowel Sounds] : normal bowel sounds [Not Tender] : non-tender [Not Distended] : not distended [Soft] : abdomen soft [Normal Anterior Cervical Nodes] : no anterior cervical lymphadenopathy [No Spinal Tenderness] : no spinal tenderness [Spine Straight] : spine straight [Kyphosis] : kyphosis present [No Stigmata of Cushings Syndrome] : no stigmata of Cushings Syndrome [Normal Gait] : normal gait [Normal Strength/Tone] : muscle strength and tone were normal [No Rash] : no rash [Acanthosis Nigricans] : no acanthosis nigricans [Normal Reflexes] : deep tendon reflexes were 2+ and symmetric [No Tremors] : no tremors [Oriented x3] : oriented to person, place, and time [de-identified] : Kyphosis

## 2022-03-01 NOTE — HISTORY OF PRESENT ILLNESS
[Finger Stick] : Finger Stick: Yes [FreeTextEntry1] : Mr. TANNA NASH is 71 year old male here to follow up for type 2 DM, osteopenia. \par \par He has been diagnosed with Type 2 DM since around 2000.  He was controlled with diet and exercise until around 2014, when his A1c was greater than 6.5% that he was started on metformin before therapy.  He has been stable on the metformin doses for the last 6 years.  Currently taking 1500 mg once daily.  His A1c has been in the range of 6.4 to 6.8% the greatest.  \par \par Patient is up to date with his ophthalmologist, he last saw him last week. There is no diabetic retinopathy.  He has glaucoma therefore he goes to his ophthalmologist on a regular basis.  He has not been to his podiatrist for a while.  Has fungus on the first toe on the right side. He hasn't been back after the pandemic. \par \par He reports no diabetic nephropathy.  EGFR 77 in Aug 2020.  He is on an ACE inhibitor due to mildly elevated microalbumin.  He is on an ACE inhibitor.  Microalbumin in March 2020 was within normal limits.\par \par He follows with cardiologist.  He has no prior history of macrovascular disease such as CAD, CHF or CVA. \par \par He is currently taking Lipitor at the recommendation of his cardiologist/pcp.   \par \par May 6, 2019: Hemoglobin A1c 6.8%, creatinine 1.09, EGFR 68, vitamin B12 732\par January 2019 microalbumin/creatinine ratio 40.0 mg/G.\par \par He checks his blood sugars every day in the morning.  \par \par Regarding osteopenia, patient has severe kyphosis March 2017 spine -2.3 wrist 0.2 fem neck -1.9 and is on Boniva tolerating well.  \par \par 3 children, 5 grandchildren.  [Continuous Glucose Monitoring] : Continuous Glucose Monitoring: No [FreeTextEntry9] : 100-110s eddie

## 2022-03-01 NOTE — ASSESSMENT
[FreeTextEntry1] : Mr. TANNA NASH is 71 year old male with well controlled Type 2 DM, HTN, HLD, osteopenia here for follow up visit.  \par \par 1. Type 2 DM\par A1c 6.4% today\par He checks glucose once a while.  Sometimes fasting in the morning 120s\par Observing a carb consistent diet.\par Recommend to continue with metformin 750 mg extended release twice daily.\par Recommend to check glucose once or twice daily.\par Opthalmology: seen this year, in Nov 2021.  Follow-up in November 2022\par  Foot exam 08/23/2021 within normal limits.  \par \par 2. HLD\par LDL 43mg/dl 4/13/2021\par Continue with Lipitor 20 mg once daily\par \par 3. HTN \par BP today: 120/90\par eGFR 78 mL/min/1.73M2 4/13/2021\par Creatinine 0.77 mg/dL 4/13/2021\par Rampirl was d/c due to hypotension.    \par Check microalbumin/creatinine level today \par  \par 4. Osteopenia of multiple sites\par Continue Ibandronate 150mg once a month, no side effects.  \par Bone density 02/19/2021 showed improvement and stabilization \par Bone density 2/19/2021\par L1-L2: BMD 0.893, T score -1.5, Z score -0.5\par (2019: BMD 0.81, T score -1.6, 1.4% increase)\par Total hip: BMD 0.856, T score -1.2, Z score -0.4\par (2019: BMD 0.836, T score -1.3, 2.4% increase)\par Femoral neck: BMD 0.78, T score -1.6, Z score 0.3\par (2019, BMD 0.693, T score -1.7, 3.6% increase)\par Repeat bone mineral density this year\par Patient has been on treatment since 2017\par Given low to moderate risk of fracture.  Consider drug holiday.\par Reassess fracture risk every 1-2.\par If bone loss or patient becomes high risk, consider restarting therapy at that point.\par \par EDUCATION: Reviewed 'ABC' of diabetes management (respective goals in parentheses): A1C (<7), blood pressure (<140/90), and cholesterol (LDL <100).\par \par COMPLIANCE at present is estimated to be:  Good \par FOLLOW UP: I recommend that frequency of visits for diabetes care be every 6 month \par

## 2022-03-03 ENCOUNTER — NON-APPOINTMENT (OUTPATIENT)
Age: 72
End: 2022-03-03

## 2022-03-03 ENCOUNTER — APPOINTMENT (OUTPATIENT)
Dept: CARDIOLOGY | Facility: CLINIC | Age: 72
End: 2022-03-03
Payer: MEDICARE

## 2022-03-03 VITALS — SYSTOLIC BLOOD PRESSURE: 118 MMHG | DIASTOLIC BLOOD PRESSURE: 70 MMHG

## 2022-03-03 VITALS
SYSTOLIC BLOOD PRESSURE: 110 MMHG | HEART RATE: 51 BPM | DIASTOLIC BLOOD PRESSURE: 80 MMHG | OXYGEN SATURATION: 98 % | WEIGHT: 155 LBS | BODY MASS INDEX: 22.89 KG/M2

## 2022-03-03 LAB
GLUCOSE BLDC GLUCOMTR-MCNC: 130
HBA1C MFR BLD HPLC: 6.4

## 2022-03-03 PROCEDURE — 93040 RHYTHM ECG WITH REPORT: CPT | Mod: 59

## 2022-03-03 PROCEDURE — 93000 ELECTROCARDIOGRAM COMPLETE: CPT

## 2022-03-03 PROCEDURE — 99214 OFFICE O/P EST MOD 30 MIN: CPT

## 2022-03-03 NOTE — HISTORY OF PRESENT ILLNESS
[FreeTextEntry1] : 8/3/2021 - Office visit:\par He denies chest pain, shortness of breath, and palpitations.\par /74 recently, 105/63 at urologist.\par Gets PSAs with urologist.\par He denies chest pain, shortness of breath, and palpitations.\par \par 12/06/2021 - Office visit:\par S/P laser TURP.\par He denies chest pain, shortness of breath, palpitations, and dizziness.\par \par 03/03/2022 - Office visit:\par He systolic blood pressures occasionally go up to 140-143 mmHg for a day or so, and then come back down to the 120s.\par He sees urologist at [place], Dr. Jensen Morales, regularly, and gets PSAs.\par He denies chest pain, shortness of breath, palpitations, and dizziness.\par \par \par \par \par

## 2022-03-07 ENCOUNTER — NON-APPOINTMENT (OUTPATIENT)
Age: 72
End: 2022-03-07

## 2022-03-15 ENCOUNTER — APPOINTMENT (OUTPATIENT)
Dept: ENDOCRINOLOGY | Facility: CLINIC | Age: 72
End: 2022-03-15
Payer: MEDICARE

## 2022-03-15 VITALS — HEIGHT: 68.3 IN | WEIGHT: 156 LBS | BODY MASS INDEX: 23.64 KG/M2 | TEMPERATURE: 97.9 F

## 2022-03-15 PROCEDURE — 77080 DXA BONE DENSITY AXIAL: CPT

## 2022-05-11 ENCOUNTER — NON-APPOINTMENT (OUTPATIENT)
Age: 72
End: 2022-05-11

## 2022-06-09 ENCOUNTER — APPOINTMENT (OUTPATIENT)
Dept: CARDIOLOGY | Facility: CLINIC | Age: 72
End: 2022-06-09
Payer: MEDICARE

## 2022-06-09 ENCOUNTER — NON-APPOINTMENT (OUTPATIENT)
Age: 72
End: 2022-06-09

## 2022-06-09 VITALS
WEIGHT: 155 LBS | OXYGEN SATURATION: 98 % | SYSTOLIC BLOOD PRESSURE: 120 MMHG | DIASTOLIC BLOOD PRESSURE: 80 MMHG | BODY MASS INDEX: 23.36 KG/M2 | HEART RATE: 49 BPM

## 2022-06-09 VITALS — DIASTOLIC BLOOD PRESSURE: 76 MMHG | SYSTOLIC BLOOD PRESSURE: 120 MMHG

## 2022-06-09 DIAGNOSIS — U07.1 COVID-19: ICD-10-CM

## 2022-06-09 PROCEDURE — 93000 ELECTROCARDIOGRAM COMPLETE: CPT

## 2022-06-09 PROCEDURE — 99214 OFFICE O/P EST MOD 30 MIN: CPT | Mod: CS

## 2022-06-09 PROCEDURE — 93040 RHYTHM ECG WITH REPORT: CPT | Mod: 59

## 2022-06-09 NOTE — HISTORY OF PRESENT ILLNESS
[FreeTextEntry1] : 8/3/2021 - Office visit:\par He denies chest pain, shortness of breath, and palpitations.\par /74 recently, 105/63 at urologist.\par Gets PSAs with urologist.\par He denies chest pain, shortness of breath, and palpitations.\par \par 12/06/2021 - Office visit:\par S/P laser TURP.\par He denies chest pain, shortness of breath, palpitations, and dizziness.\par \par 03/03/2022 - Office visit:\par He systolic blood pressures occasionally go up to 140-143 mmHg for a day or so, and then come back down to the 120s.\par He sees urologist at [place], Dr. Jensen Morales, regularly, and gets PSAs.\par He denies chest pain, shortness of breath, palpitations, and dizziness.\par \par 06/09/2022 - Office visit:\par Had COVID - no residual symptoms.\par He denies chest pain, shortness of breath, palpitations, and dizziness.\par \par \par \par \par \par

## 2022-06-15 ENCOUNTER — NON-APPOINTMENT (OUTPATIENT)
Age: 72
End: 2022-06-15

## 2022-09-10 RX ORDER — NIRMATRELVIR AND RITONAVIR 300-100 MG
20 X 150 MG & KIT ORAL
Qty: 1 | Refills: 0 | Status: DISCONTINUED | COMMUNITY
Start: 2022-05-11 | End: 2022-09-10

## 2022-09-16 ENCOUNTER — APPOINTMENT (OUTPATIENT)
Dept: CARDIOLOGY | Facility: CLINIC | Age: 72
End: 2022-09-16

## 2022-09-16 ENCOUNTER — NON-APPOINTMENT (OUTPATIENT)
Age: 72
End: 2022-09-16

## 2022-09-16 ENCOUNTER — LABORATORY RESULT (OUTPATIENT)
Age: 72
End: 2022-09-16

## 2022-09-16 VITALS
DIASTOLIC BLOOD PRESSURE: 72 MMHG | SYSTOLIC BLOOD PRESSURE: 126 MMHG | BODY MASS INDEX: 23.04 KG/M2 | OXYGEN SATURATION: 100 % | HEIGHT: 68.3 IN | WEIGHT: 152 LBS | HEART RATE: 49 BPM

## 2022-09-16 VITALS — DIASTOLIC BLOOD PRESSURE: 90 MMHG | SYSTOLIC BLOOD PRESSURE: 142 MMHG

## 2022-09-16 DIAGNOSIS — R42 DIZZINESS AND GIDDINESS: ICD-10-CM

## 2022-09-16 PROCEDURE — 93040 RHYTHM ECG WITH REPORT: CPT | Mod: 59

## 2022-09-16 PROCEDURE — 99214 OFFICE O/P EST MOD 30 MIN: CPT

## 2022-09-16 PROCEDURE — 93000 ELECTROCARDIOGRAM COMPLETE: CPT

## 2022-09-16 PROCEDURE — 36415 COLL VENOUS BLD VENIPUNCTURE: CPT

## 2022-09-16 NOTE — HISTORY OF PRESENT ILLNESS
[FreeTextEntry1] : 8/3/2021 - Office visit:\par He denies chest pain, shortness of breath, and palpitations.\par /74 recently, 105/63 at urologist.\par Gets PSAs with urologist.\par He denies chest pain, shortness of breath, and palpitations.\par \par 12/06/2021 - Office visit:\par S/P laser TURP.\par He denies chest pain, shortness of breath, palpitations, and dizziness.\par \par 03/03/2022 - Office visit:\par He systolic blood pressures occasionally go up to 140-143 mmHg for a day or so, and then come back down to the 120s.\par He sees urologist at [place], Dr. Jensen Morales, regularly, and gets PSAs.\par He denies chest pain, shortness of breath, palpitations, and dizziness.\par \par 06/09/2022 - Office visit:\par Had COVID - no residual symptoms.\par He denies chest pain, shortness of breath, palpitations, and dizziness.\par \par 09/16/2022 - Office visit:\par He runs 3 to 4 miles a week.  With running recently, he has noticed leg weakness and that his head feels "foggy," forcing him to have to walk instead.  He only experiences the symptoms with running.  E\par He denies chest pain, shortness of breath, and palpitations.\par \par \par \par \par

## 2022-09-21 ENCOUNTER — NON-APPOINTMENT (OUTPATIENT)
Age: 72
End: 2022-09-21

## 2022-10-04 ENCOUNTER — NON-APPOINTMENT (OUTPATIENT)
Age: 72
End: 2022-10-04

## 2022-10-05 DIAGNOSIS — R68.89 OTHER GENERAL SYMPTOMS AND SIGNS: ICD-10-CM

## 2022-10-08 LAB
ALBUMIN SERPL ELPH-MCNC: 4.7 G/DL
ALP BLD-CCNC: 63 U/L
ALT SERPL-CCNC: 14 U/L
ANION GAP SERPL CALC-SCNC: 9 MMOL/L
AST SERPL-CCNC: 19 U/L
BASOPHILS # BLD AUTO: 0.04 K/UL
BASOPHILS NFR BLD AUTO: 0.6 %
BILIRUB SERPL-MCNC: 1.4 MG/DL
BUN SERPL-MCNC: 24 MG/DL
CALCIUM SERPL-MCNC: 9.6 MG/DL
CHLORIDE SERPL-SCNC: 101 MMOL/L
CHOLEST SERPL-MCNC: 130 MG/DL
CO2 SERPL-SCNC: 28 MMOL/L
CREAT SERPL-MCNC: 0.9 MG/DL
EGFR: 90 ML/MIN/1.73M2
EOSINOPHIL # BLD AUTO: 0.14 K/UL
EOSINOPHIL NFR BLD AUTO: 2.2 %
GLUCOSE SERPL-MCNC: 120 MG/DL
HCT VFR BLD CALC: 45.4 %
HDLC SERPL-MCNC: 79 MG/DL
HGB BLD-MCNC: 14.6 G/DL
IMM GRANULOCYTES NFR BLD AUTO: 0.3 %
LDLC SERPL CALC-MCNC: 43 MG/DL
LDLC SERPL DIRECT ASSAY-MCNC: 47 MG/DL
LYMPHOCYTES # BLD AUTO: 1.28 K/UL
LYMPHOCYTES NFR BLD AUTO: 20.5 %
MAN DIFF?: NORMAL
MCHC RBC-ENTMCNC: 30.9 PG
MCHC RBC-ENTMCNC: 32.2 GM/DL
MCV RBC AUTO: 96 FL
MONOCYTES # BLD AUTO: 0.65 K/UL
MONOCYTES NFR BLD AUTO: 10.4 %
NEUTROPHILS # BLD AUTO: 4.12 K/UL
NEUTROPHILS NFR BLD AUTO: 66 %
NONHDLC SERPL-MCNC: 52 MG/DL
PLATELET # BLD AUTO: 184 K/UL
POTASSIUM SERPL-SCNC: 4.7 MMOL/L
PROT SERPL-MCNC: 7.4 G/DL
RBC # BLD: 4.73 M/UL
RBC # FLD: 15.3 %
SODIUM SERPL-SCNC: 138 MMOL/L
T3 SERPL-MCNC: 76 NG/DL
T3RU NFR SERPL: 0.9 TBI
T4 FREE SERPL-MCNC: 1.3 NG/DL
T4 SERPL-MCNC: 6.3 UG/DL
TRIGL SERPL-MCNC: 46 MG/DL
TSH SERPL-ACNC: 2.01 UIU/ML
WBC # FLD AUTO: 6.25 K/UL

## 2022-10-11 ENCOUNTER — APPOINTMENT (OUTPATIENT)
Dept: ENDOCRINOLOGY | Facility: CLINIC | Age: 72
End: 2022-10-11

## 2022-10-11 VITALS
SYSTOLIC BLOOD PRESSURE: 138 MMHG | HEART RATE: 51 BPM | DIASTOLIC BLOOD PRESSURE: 82 MMHG | WEIGHT: 152 LBS | BODY MASS INDEX: 23.04 KG/M2 | HEIGHT: 68 IN | TEMPERATURE: 97.8 F | OXYGEN SATURATION: 100 %

## 2022-10-11 PROCEDURE — 99214 OFFICE O/P EST MOD 30 MIN: CPT

## 2022-10-11 PROCEDURE — 82962 GLUCOSE BLOOD TEST: CPT

## 2022-10-11 RX ORDER — IBANDRONATE SODIUM 150 MG/1
150 TABLET ORAL
Qty: 3 | Refills: 3 | Status: DISCONTINUED | COMMUNITY
Start: 2017-03-16 | End: 2022-10-11

## 2022-10-11 NOTE — RESULTS/DATA
[FreeTextEntry1] : Bone density 2/19/2021\par L1-L2: BMD 0.893, T score -1.5, Z score -0.5\par (2019: BMD 0.81, T score -1.6, 1.4% increase)\par Total hip: BMD 0.856, T score -1.2, Z score -0.4\par (2019: BMD 0.836, T score -1.3, 2.4% increase)\par Femoral neck: BMD 0.78, T score -1.6, Z score 0.3\par (2019, BMD 0.693, T score -1.7, 3.6% increase)\par Repeat bone mineral density this year\par Patient has been on treatment since 2017

## 2022-10-11 NOTE — ASSESSMENT
[FreeTextEntry1] : Mr. TANNA NASH is a 72year old male with well controlled Type 2 DM, HTN, HLD, osteopenia here for follow up visit.  \par \par 1. Type 2 DM\par A1c 6.6% today, which is within target.\par He checks glucose once a while.  Sometimes fasting in the morning 120s\par He is adherent with a carb consistent diet.\par Recommend to continue with metformin 750 mg extended release twice daily.\par Recommend to check glucose once or twice daily.\par Opthalmology: seen this year, in Nov 2021.  Follow-up in November 2022\par Foot exam done within this year.  Within normal limits.\par \par 2. HLD\par Most recent LDL level is 43 mg/dL in October 2022\par LDL goal <70 mg/dL\par Following up with cardiology.\par \par Continue with Lipitor 20 mg once daily\par \par 3. HTN \par BP today: 120/90\par eGFR 78 mL/min/1.73M2 4/13/2021\par Creatinine 0.77 mg/dL 4/13/2021\par Rampirl was d/c due to hypotension.    \par Check microalbumin/creatinine level today \par  \par 4. Osteopenia of multiple sites\par Continue Ibandronate 150mg once a month, no side effects.  \par Bone density 02/19/2021 showed improvement and stabilization \par Presents today March 15, 2022\par L1 2: BMD 0.874, T score -1.6, Z score -0.7\par 2021: BMD 0.893, T score -1.5, -2.1% compared to 2021\par Total hip: BMD 0.854, T score -1.2, Z score -0.4\par 2021: BMD 0.856, T score -1.2, -0.3% compared to 2021\par Femoral neck: BMD 0.719, T score -1.5, Z score -0.3\par 2021: BMD 0.78, T score -1.6, +0.2% compared to 2021\par Patient has been on treatment since 2017\par Given low to moderate risk of fracture.  Consider drug holiday.\par Continue with drug holiday\par Repeat bone mineral density March 2023\par He takes Vitamin D 3, 2000 Iu daily.  \par No fractures, no falls.  \par \par \par

## 2022-10-11 NOTE — HISTORY OF PRESENT ILLNESS
[FreeTextEntry1] : Mr. TANNA NASH is a 72year old male here to follow up for type 2 DM, osteopenia. \par \par He has been diagnosed with Type 2 DM since around 2000.  He was controlled with diet and exercise until around 2014, when his A1c was greater than 6.5% that he was started on metformin before therapy.  He has been stable on the metformin doses for the last 6 years.  Currently taking 1500 mg once daily.  His A1c has been in the range of 6.4 to 6.8% the greatest.  \par \par Patient is up to date with his ophthalmologist, he last saw him last week. There is no diabetic retinopathy.  He has glaucoma therefore he goes to his ophthalmologist on a regular basis.  He has not been to his podiatrist for a while.  Has fungus on the first toe on the right side. He hasn't been back after the pandemic. \par \par He reports no diabetic nephropathy.  He is on an ACE inhibitor due to mildly elevated microalbumin.  He is on an ACE inhibitor.  Albumin/creatinine ratio has been mildly elevated in the range of 30 to 40 mg/g over the last few years.\par \par He follows with cardiologist.  He has no prior history of macrovascular disease such as CAD, CHF or CVA. \par \par He is currently taking Lipitor at the recommendation of his cardiologist/pcp.   \par \par He checks his blood sugars every day in the morning.  \par \par Regarding osteopenia, patient has severe kyphosis March 2017 spine -2.3 wrist 0.2 fem neck -1.9 and is on Boniva tolerating well.  He was just Boniva due to good response to treatment.  He is currently on a drug holiday for osteoporosis treatment.\par \par 3 children, 5 grandchildren. 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

## 2022-10-12 LAB
CREAT SPEC-SCNC: 131 MG/DL
GLUCOSE BLDC GLUCOMTR-MCNC: 141
HBA1C MFR BLD HPLC: 6.6
MICROALBUMIN 24H UR DL<=1MG/L-MCNC: 5.2 MG/DL
MICROALBUMIN/CREAT 24H UR-RTO: 40 MG/G

## 2022-10-14 ENCOUNTER — APPOINTMENT (OUTPATIENT)
Dept: CARDIOLOGY | Facility: CLINIC | Age: 72
End: 2022-10-14

## 2022-10-14 ENCOUNTER — NON-APPOINTMENT (OUTPATIENT)
Age: 72
End: 2022-10-14

## 2022-10-14 PROCEDURE — 93015 CV STRESS TEST SUPVJ I&R: CPT

## 2022-10-14 PROCEDURE — A9500: CPT

## 2022-10-14 PROCEDURE — 78452 HT MUSCLE IMAGE SPECT MULT: CPT

## 2022-10-24 ENCOUNTER — NON-APPOINTMENT (OUTPATIENT)
Age: 72
End: 2022-10-24

## 2022-10-26 ENCOUNTER — APPOINTMENT (OUTPATIENT)
Dept: CARDIOLOGY | Facility: CLINIC | Age: 72
End: 2022-10-26

## 2022-11-10 ENCOUNTER — NON-APPOINTMENT (OUTPATIENT)
Age: 72
End: 2022-11-10

## 2022-11-10 ENCOUNTER — APPOINTMENT (OUTPATIENT)
Dept: ELECTROPHYSIOLOGY | Facility: CLINIC | Age: 72
End: 2022-11-10

## 2022-11-10 VITALS — DIASTOLIC BLOOD PRESSURE: 86 MMHG | SYSTOLIC BLOOD PRESSURE: 130 MMHG

## 2022-11-10 VITALS
BODY MASS INDEX: 23.64 KG/M2 | DIASTOLIC BLOOD PRESSURE: 87 MMHG | SYSTOLIC BLOOD PRESSURE: 149 MMHG | HEART RATE: 54 BPM | WEIGHT: 156 LBS | OXYGEN SATURATION: 100 % | HEIGHT: 68 IN

## 2022-11-10 DIAGNOSIS — I49.1 ATRIAL PREMATURE DEPOLARIZATION: ICD-10-CM

## 2022-11-10 DIAGNOSIS — H40.9 UNSPECIFIED GLAUCOMA: ICD-10-CM

## 2022-11-10 PROCEDURE — 93000 ELECTROCARDIOGRAM COMPLETE: CPT

## 2022-11-10 PROCEDURE — 99205 OFFICE O/P NEW HI 60 MIN: CPT

## 2022-11-10 RX ORDER — METOPROLOL SUCCINATE 25 MG/1
25 TABLET, EXTENDED RELEASE ORAL DAILY
Qty: 30 | Refills: 3 | Status: ACTIVE | COMMUNITY
Start: 2022-11-10 | End: 1900-01-01

## 2022-11-10 RX ORDER — LATANOPROST/PF 0.005 %
DROPS OPHTHALMIC (EYE)
Refills: 0 | Status: ACTIVE | COMMUNITY

## 2022-11-10 RX ORDER — TIMOLOL MALEATE 5 MG/ML
0.5 SOLUTION OPHTHALMIC
Refills: 0 | Status: ACTIVE | COMMUNITY
Start: 2022-10-10

## 2022-11-10 RX ORDER — ASPIRIN ENTERIC COATED TABLETS 81 MG 81 MG/1
81 TABLET, DELAYED RELEASE ORAL DAILY
Qty: 90 | Refills: 3 | Status: DISCONTINUED | COMMUNITY
Start: 2018-01-17 | End: 2022-11-10

## 2022-11-10 RX ORDER — ASCORBIC ACID 500 MG
TABLET ORAL
Refills: 0 | Status: ACTIVE | COMMUNITY

## 2022-11-10 RX ORDER — VITS A,C,E/LUTEIN/MINERALS 300MCG-200
TABLET ORAL
Refills: 0 | Status: ACTIVE | COMMUNITY

## 2022-11-10 RX ORDER — CYCLOSPORINE 0.5 MG/ML
0.05 EMULSION OPHTHALMIC
Refills: 0 | Status: ACTIVE | COMMUNITY
Start: 2022-05-09

## 2022-11-10 RX ORDER — EFINACONAZOLE 100 MG/ML
10 SOLUTION TOPICAL
Qty: 8 | Refills: 0 | Status: DISCONTINUED | COMMUNITY
Start: 2020-01-23 | End: 2022-11-10

## 2022-11-23 ENCOUNTER — OUTPATIENT (OUTPATIENT)
Dept: OUTPATIENT SERVICES | Facility: HOSPITAL | Age: 72
LOS: 1 days | End: 2022-11-23

## 2022-11-23 ENCOUNTER — RESULT REVIEW (OUTPATIENT)
Age: 72
End: 2022-11-23

## 2022-11-23 ENCOUNTER — APPOINTMENT (OUTPATIENT)
Dept: CT IMAGING | Facility: CLINIC | Age: 72
End: 2022-11-23

## 2022-11-23 PROBLEM — I49.1 ATRIAL PREMATURE CONTRACTIONS: Status: ACTIVE | Noted: 2021-09-09

## 2022-11-23 PROCEDURE — 75574 CT ANGIO HRT W/3D IMAGE: CPT | Mod: 26,MH

## 2022-11-23 NOTE — DISCUSSION/SUMMARY
[FreeTextEntry1] : With exercise induced (Sinus tach) VT, I would like to be sure he has no significant CAD. I have ordered a CCTA. I have asked him to start metoprolol succinate 25 mg daily. Although he has some nocturnal sinus bradycardia, I am not to concerned about starting this dose in the am. He may be a risk for PAF. In the absence of significant symptoms, a beta blocker alone may suffice if there is no significant CAD. We may need to consider an cardiac MRI if CT is unrevealing. No manly history of SCD. He had a negative sleep study in 2019.ECG is normal [EKG obtained to assist in diagnosis and management of assessed problem(s)] : EKG obtained to assist in diagnosis and management of assessed problem(s)

## 2022-11-23 NOTE — HISTORY OF PRESENT ILLNESS
[FreeTextEntry1] : 72 year old man who is fairly healthy but with risk factors for CAD (HTN, dyslipidemia) and had a 5 day Zio which showed some NSVT when his sinus rates were elevated up to 210 bpm  and up to 10 beats with palpitations. Moderate  frequency of premature atrial depolarizations and some nonsustained AT. No clear AF. Previous nuclear stress with no clear abnormalities (inferior defect improved with prone stress). No syncope. Occasional palpitations. Nuclear gated EF 56%, Echo EF in 2021 was 60-65%. No LV dilatation.

## 2022-12-15 ENCOUNTER — NON-APPOINTMENT (OUTPATIENT)
Age: 72
End: 2022-12-15

## 2022-12-15 ENCOUNTER — APPOINTMENT (OUTPATIENT)
Dept: CARDIOLOGY | Facility: CLINIC | Age: 72
End: 2022-12-15

## 2022-12-15 VITALS
HEART RATE: 47 BPM | OXYGEN SATURATION: 100 % | DIASTOLIC BLOOD PRESSURE: 84 MMHG | SYSTOLIC BLOOD PRESSURE: 128 MMHG | WEIGHT: 158 LBS | HEIGHT: 68 IN | RESPIRATION RATE: 17 BRPM | BODY MASS INDEX: 23.95 KG/M2

## 2022-12-15 PROCEDURE — 99214 OFFICE O/P EST MOD 30 MIN: CPT

## 2022-12-15 PROCEDURE — 93000 ELECTROCARDIOGRAM COMPLETE: CPT

## 2022-12-24 ENCOUNTER — RESULT CHARGE (OUTPATIENT)
Age: 72
End: 2022-12-24

## 2022-12-25 NOTE — HISTORY OF PRESENT ILLNESS
[FreeTextEntry1] : 8/3/2021 - Office visit:\par He denies chest pain, shortness of breath, and palpitations.\par /74 recently, 105/63 at urologist.\par Gets PSAs with urologist.\par He denies chest pain, shortness of breath, and palpitations.\par \par 12/06/2021 - Office visit:\par S/P laser TURP.\par He denies chest pain, shortness of breath, palpitations, and dizziness.\par \par 03/03/2022 - Office visit:\par He systolic blood pressures occasionally go up to 140-143 mmHg for a day or so, and then come back down to the 120s.\par He sees urologist at [place], Dr. Jensen Morales, regularly, and gets PSAs.\par He denies chest pain, shortness of breath, palpitations, and dizziness.\par \par 06/09/2022 - Office visit:\par Had COVID - no residual symptoms.\par He denies chest pain, shortness of breath, palpitations, and dizziness.\par \par 09/16/2022 - Office visit:\par He runs 3 to 4 miles a week.  With running recently, he has noticed leg weakness and that his head feels "foggy," forcing him to have to walk instead.  He only experiences the symptoms with running.  \par He denies chest pain, shortness of breath, and palpitations.\par \par 12/24/2022 - Office visit:\par He is on metoprolol succinate 25 daily.\par CCTA - nonobstructive disease.\par He denies chest pain, shortness of breath, and palpitations.\par \par \par \par \par \par

## 2022-12-27 ENCOUNTER — NON-APPOINTMENT (OUTPATIENT)
Age: 72
End: 2022-12-27

## 2023-03-01 ENCOUNTER — NON-APPOINTMENT (OUTPATIENT)
Age: 73
End: 2023-03-01

## 2023-03-06 ENCOUNTER — APPOINTMENT (OUTPATIENT)
Dept: PULMONOLOGY | Facility: CLINIC | Age: 73
End: 2023-03-06
Payer: MEDICARE

## 2023-03-06 ENCOUNTER — APPOINTMENT (OUTPATIENT)
Dept: PULMONOLOGY | Facility: CLINIC | Age: 73
End: 2023-03-06

## 2023-03-06 VITALS
SYSTOLIC BLOOD PRESSURE: 144 MMHG | HEIGHT: 68 IN | HEART RATE: 51 BPM | WEIGHT: 152 LBS | BODY MASS INDEX: 23.04 KG/M2 | TEMPERATURE: 97 F | OXYGEN SATURATION: 97 % | RESPIRATION RATE: 15 BRPM | DIASTOLIC BLOOD PRESSURE: 82 MMHG

## 2023-03-06 PROCEDURE — 99213 OFFICE O/P EST LOW 20 MIN: CPT

## 2023-03-06 RX ORDER — DUTASTERIDE 0.5 MG/1
CAPSULE, LIQUID FILLED ORAL
Refills: 0 | Status: ACTIVE | COMMUNITY

## 2023-03-06 NOTE — ASSESSMENT
[FreeTextEntry1] : Labs:\par 9/2022\par WBC 6.3 (), Hgb 14.6, Plts 184\par Na 138, K 4.7, Cl 101, HCO3 28, BUN/creat 24/0.9,\par Tbili 1.4, AST/ALT 19/14, Alk phos 63, TP/Albumin 7.4/4.7\par \par PFTs:                 2019\par FEV1/FVC         81%\par FEV1                 3.63, 117%\par FVC                   4.50, 106%\par TLC                   7.13, 108%\par RV                      2.62, 110%\par DLCO                 26.5, 103%\par -No significant bronchodilator response.\par \par TTE (12/2021):\par 1. Mitral valve prolapse involving the posterior mitral leaflet. Mild mitral regurgitation.\par 2. Sclerotic aortic valve leaflets with normal opening. No aortic valve regurgitation seen.\par 3. moderate dilated left atrium (LA volume index = 42 cc/m2)\par 4. Increased relative wall thickness with normal left ventricular mass index, consistent with concentric left ventricular remodeling.\par 5. No segmental wall motion abnormalities. Normal left ventricular systolic function (LVEF 60-65% by visual estimate).\par 6. Mild diastolic dysfunction (stage II).\par 7. Normal tricuspid valve. Mild tricuspid regurgitation.\par 8. Normal pulmonic valve. Minimal pulmonic regurgitation.\par [Normal right atrium. The right ventricle is not well visualized. Normal right ventricular size. Unable to determine right ventricular systolic function.]\par \par Cardiac Stress test (10/2022):\par Impressions: Normal study\par -The left ventricle was normal in size. There is a medium sized, mild defect in the inferior wall that is fixed, with normal wall motion, consistent with diaphragmatic attenuation artifact. The observed defect(s) are no longer significant with prone imaging.\par -Gated wall motion analysis is performed, and shows normal wall motion with post stress LVEF of 56% and post stress LVEDV of 95 mL.\par \par Chest CT (11/2022):\par IMPRESSION:\par Cardiac:\par 1. The calcium score is moderate at 175 Agatston units, which is at the 47 percentile, adjusted for age, gender and race.\par 2. Nonobstructive coronary artery disease.\par Non-cardiac:\par Nonspecific left lower lobe micronodule [6 mm]. 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 There is chart reference to upper lobe fibrotic changes (on CXR) and prior echocardiograms with RV dysfunction.\par \par Mr. Najera feels well today. The symptoms that precipitated his cardiac evaluation have improved, though he has not yet returned to his prior activity level. We reviewed his pulmonary evaluations and LLL nodule.\par \par For the nodule, his history of melanoma increases the risk of malignancy to the intermediate range . Though the Fleischner guidelines recommend follow-up in 6-12 months for a 6 mm solid nodule (low or high risk categories), I would favor a chest CT now (~4 months from prior) due to his history of melanoma (in-situ), prior suspicion for upper lobe fibrosis (not imaged on the current cardiac CT), and the expiratory technique used in the cardiac CT. Mr. Najera agreed. Given the low size, there is no role for PET. If persistent or growing, we will discuss surveillance, biopsy, or PET/CT.\par \par Regarding Mr. Najera's prior lung disease, the normal PFTs in 2019 argue against physiologically significant pulmonary fibrosis. A complete chest CT will clarify the clinical picture. If his nodule is persistent or if his symptoms recur, we will repeat his PFTs.\par \par Regarding the reference to RV dysfunction, the TTE from 2021 and stress test from 2022 suggest diastolic dysfunction with left atrial enlargement. I do not identify references to RV dysfunction. Mr. Najera has a follow-up TTE with Dr. Lopez this month. \par \par 1. Incidental pulmonary nodule, LLL, intermediate risk for malignancy\par 2. ? h/o upper lobe predominant fibrotic changes\par 3. ? h/o RV enlargement\par 4. h/o Melanoma in situ\par 5. Dizziness during exercise in summer 2022\par \par -chest CT now to re-evaluate the nodule with inspiratory technique and visualize the upper lobes; he would like to follow-up at [facility]\par -consider repeat PFT pending his CT results\par -repeat TTE this month\par -Vaccinations: not addressed at this visit\par -follow-up with me in 2-3 weeks

## 2023-03-06 NOTE — HISTORY OF PRESENT ILLNESS
[Never] : never [TextBox_4] : I saw and evaluated Mr. Galdino Najera today; he was accompanied by his wife, who supplemented his history. In review, Mr. Najera is a 72 yo M h/o CAD, kyphosis, melanoma in-situ (R calf; s/p resection in 2013), COVID-19 (5/2022, 1/2023; both mild), HTN, HLD, mitral valve prolapse, and NSVT who is referred after a cardiac scoring CT. Mr. Najera has been cared for by Dr. Aldair Lopez for dizziness and bradycardia. Regarding prior evaluations, Mr. Najera was evaluated by Dr. Eric Velasco in 6/2017 for a chest x-ray suggesting upper lobe fibrosis. Since there was no appreciable change in imaging since 2008, additional evaluation was not pursued. More recently, Mr. Najera was evaluated by Sleep Medicine in 2019 with a polysomnogram negative for IAIN. Due to suspected RV dysfunction, PFTs were obtained (and normal).\par \par Today, Mr. Najera reports his current course began with an episode of dizziness while jogging in the Summer of 2022. His evaluation included Holter monitoring x2, cardiac stress test, and cardiac CT. He did not have dyspnea, chest pain, wheezing, or cough during the episode or currently. He describes the episode as activity limiting and associated with leg fatigue, nausea, and his head feeling "foggy." Since the episode, he stopped jogging and has not had further episodes. He did not have syncope at the time nor subsequently. \par \par Mr. Najera denies any history of hemoptysis; he denies fever, chills, night sweats, weight loss, or chest pain in the distribution of his left lower lobe nodule. \par \par Mr. Najera denies any history of childhood lung disease. He had a single episode of pneumonia as a child, and he has used an Albuterol inhaler as an adult during respiratory infections. During much of his life, he has jogged regularly and been quite active. \par \par From an activity perspective, Mr. Najera and his wife have been walking regularly. He has not had symptom recurrence nor activity limitation since his episode in 2022. Mr. Najera has an appointment next week with Dr. Lopez to discuss re-escalating his activity regimen.\par \par PMH:\par CAD\par BPH\par COVID-19 (5/2022, 1/2023; mild)\par Diverticulosis\par HTN\par HLD\par NSVT\par Mitral valve prolapse\par ? Upper lobe changes suspicious for pulmonary fibrosis\par Osteopenia\par DM\par Melanoma-in-situ\par Bladder polyp \par \par PSH:\par Hernia repair x2\par Melanoma-in-situ (R posterior calf s/p resectoin in 2013)\par Bladder polyp s/p resection\par \par FH:\par Mom: HTN, Pancreatic cancer (age 92)\par Dad: CAD, heart valve replacement\par \par SH:\par Mr. Najera and his wife live in [place]. He was born in [place]. 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 Najera is a never-smoker. He drinks alcohol 1X/month. He denies illicit drug use. He does not vape. \par \par Mr. Najera endorses exposure to copper and plastic fumes while working at his family's business. He denies exposure to Tuberculosis, Asbestos, mold, dust, smoke, heavy metals, Beryllium, birds, feathers, or hot tubs.

## 2023-03-06 NOTE — CONSULT LETTER
[Dear  ___] : Dear  [unfilled], [Consult Letter:] : I had the pleasure of evaluating your patient, [unfilled]. [Please see my note below.] : Please see my note below. [Consult Closing:] : Thank you very much for allowing me to participate in the care of this patient.  If you have any questions, please do not hesitate to contact me. [Sincerely,] : Sincerely, [FreeTextEntry2] : Aldair Lopez [FreeTextEntry1] : Dr. Lopez- MELODY saw Mr. Najera today. He is feeling well. The left lower lobe incidental pulmonary nodule is intermediate risk due to his history of melanoma. We will repeat his chest CT now to re-evaluate the nodule as well as his complete lung parenchyma (re: prior suspicion for upper lobe changes). His chart review suggests RV dysfunction in the past, though I don't identify it most recently.\par \par Please call me if you have additional thoughts.\par \par Marito Jackson\diandra C: [phone] [DrFidencio  ___] : Dr. FRAGOSO [FreeTextEntry3] : Marito Jackson

## 2023-03-06 NOTE — PHYSICAL EXAM
[No Acute Distress] : no acute distress [Normal Oropharynx] : normal oropharynx [Normal Appearance] : normal appearance [Supple] : supple [No Neck Mass] : no neck mass [Normal Rate/Rhythm] : normal rate/rhythm [Normal S1, S2] : normal s1, s2 [No Murmurs] : no murmurs [No Resp Distress] : no resp distress [Clear to Auscultation Bilaterally] : clear to auscultation bilaterally [No Abnormalities] : no abnormalities [Benign] : benign [Normal Gait] : normal gait [No Clubbing] : no clubbing [No Cyanosis] : no cyanosis [No Edema] : no edema [FROM] : FROM [Normal Color/ Pigmentation] : normal color/ pigmentation [No Focal Deficits] : no focal deficits [Oriented x3] : oriented x3 [Normal Affect] : normal affect [TextBox_2] : Kyphosis. [TextBox_11] : N

## 2023-03-09 ENCOUNTER — RX RENEWAL (OUTPATIENT)
Age: 73
End: 2023-03-09

## 2023-03-13 ENCOUNTER — NON-APPOINTMENT (OUTPATIENT)
Age: 73
End: 2023-03-13

## 2023-03-13 ENCOUNTER — APPOINTMENT (OUTPATIENT)
Dept: CARDIOLOGY | Facility: CLINIC | Age: 73
End: 2023-03-13
Payer: MEDICARE

## 2023-03-13 ENCOUNTER — LABORATORY RESULT (OUTPATIENT)
Age: 73
End: 2023-03-13

## 2023-03-13 VITALS
OXYGEN SATURATION: 99 % | HEIGHT: 68 IN | WEIGHT: 155 LBS | DIASTOLIC BLOOD PRESSURE: 80 MMHG | HEART RATE: 47 BPM | BODY MASS INDEX: 23.49 KG/M2 | SYSTOLIC BLOOD PRESSURE: 102 MMHG

## 2023-03-13 VITALS — DIASTOLIC BLOOD PRESSURE: 84 MMHG | SYSTOLIC BLOOD PRESSURE: 129 MMHG

## 2023-03-13 PROCEDURE — 36415 COLL VENOUS BLD VENIPUNCTURE: CPT

## 2023-03-13 PROCEDURE — 93000 ELECTROCARDIOGRAM COMPLETE: CPT

## 2023-03-13 PROCEDURE — 93306 TTE W/DOPPLER COMPLETE: CPT

## 2023-03-13 PROCEDURE — 99214 OFFICE O/P EST MOD 30 MIN: CPT

## 2023-03-13 PROCEDURE — 93040 RHYTHM ECG WITH REPORT: CPT | Mod: 59

## 2023-03-13 RX ORDER — TIMOLOL MALEATE 5 MG/ML
SOLUTION/ DROPS OPHTHALMIC
Refills: 0 | Status: COMPLETED | COMMUNITY
End: 2023-03-13

## 2023-03-13 NOTE — HISTORY OF PRESENT ILLNESS
[FreeTextEntry1] : 8/3/2021 - Office visit:\par He denies chest pain, shortness of breath, and palpitations.\par /74 recently, 105/63 at urologist.\par Gets PSAs with urologist.\par He denies chest pain, shortness of breath, and palpitations.\par \par 12/06/2021 - Office visit:\par S/P laser TURP.\par He denies chest pain, shortness of breath, palpitations, and dizziness.\par \par 03/03/2022 - Office visit:\par He systolic blood pressures occasionally go up to 140-143 mmHg for a day or so, and then come back down to the 120s.\par He sees urologist at [facility], Dr. Jensen Morales, regularly, and gets PSAs.\par He denies chest pain, shortness of breath, palpitations, and dizziness.\par \par 06/09/2022 - Office visit:\par Had COVID - no residual symptoms.\par He denies chest pain, shortness of breath, palpitations, and dizziness.\par \par 09/16/2022 - Office visit:\par He runs 3 to 4 miles a week.  With running recently, he has noticed leg weakness and that his head feels "foggy," forcing him to have to walk instead.  He only experiences the symptoms with running.  \par He denies chest pain, shortness of breath, and palpitations.\par \par 12/15/2022 - Office visit:\par He is on metoprolol succinate 25 daily.\par CCTA - nonobstructive disease.\par He denies chest pain, shortness of breath, and palpitations.\par \par 03/13/2023 - Office visit:\par He denies chest pain, shortness of breath, and palpitations.\par He had a colonoscopy last week which revealed 1 small polyp; he was told there was no need for a repeat colonoscopy.\par He sees a urologist regularly for BPH, and has been obtaining annual PSAs from him.\par \par \par \par \par \par

## 2023-03-15 ENCOUNTER — NON-APPOINTMENT (OUTPATIENT)
Age: 73
End: 2023-03-15

## 2023-03-20 ENCOUNTER — OUTPATIENT (OUTPATIENT)
Dept: OUTPATIENT SERVICES | Facility: HOSPITAL | Age: 73
LOS: 1 days | End: 2023-03-20

## 2023-03-20 ENCOUNTER — APPOINTMENT (OUTPATIENT)
Dept: ENDOCRINOLOGY | Facility: CLINIC | Age: 73
End: 2023-03-20
Payer: MEDICARE

## 2023-03-20 ENCOUNTER — APPOINTMENT (OUTPATIENT)
Dept: CT IMAGING | Facility: CLINIC | Age: 73
End: 2023-03-20
Payer: MEDICARE

## 2023-03-20 VITALS
BODY MASS INDEX: 23.67 KG/M2 | WEIGHT: 158 LBS | SYSTOLIC BLOOD PRESSURE: 120 MMHG | HEART RATE: 53 BPM | HEIGHT: 68.5 IN | OXYGEN SATURATION: 98 % | DIASTOLIC BLOOD PRESSURE: 88 MMHG

## 2023-03-20 PROCEDURE — 71250 CT THORAX DX C-: CPT | Mod: 26,MH

## 2023-03-20 PROCEDURE — 99214 OFFICE O/P EST MOD 30 MIN: CPT | Mod: 25

## 2023-03-20 PROCEDURE — 77080 DXA BONE DENSITY AXIAL: CPT | Mod: GA

## 2023-03-20 PROCEDURE — ZZZZZ: CPT

## 2023-03-20 NOTE — HISTORY OF PRESENT ILLNESS
[FreeTextEntry1] : Mr. TANNA NASH is a 72year old male here to follow up for type 2 DM, osteopenia. \par \par He has been diagnosed with Type 2 DM since around 2000.  He was controlled with diet and exercise until around 2014, when his A1c was greater than 6.5% that he was started on metformin before therapy.  He has been stable on the metformin doses for the last 6 years.  Currently taking 1500 mg once daily.  His A1c has been in the range of 6.4 to 6.8% the greatest.  \par \par Patient is up to date with his ophthalmologist, he last saw him last week. There is no diabetic retinopathy.  He has glaucoma therefore he goes to his ophthalmologist on a regular basis.  He has not been to his podiatrist for a while.  Patient with onychomycosis on his foot.  Has not been back directors for a while.\par \par He reports no diabetic nephropathy.  He is on an ACE inhibitor due to mildly elevated microalbumin.  He is on an ACE inhibitor.  Albumin/creatinine ratio has been mildly elevated in the range of 30 to 40 mg/g over the last few years.\par \par He follows with cardiologist.  He has no prior history of macrovascular disease such as CAD, CHF or CVA. \par \par He is currently taking Lipitor at the recommendation of his cardiologist/pcp.   \par \par He checks his blood sugars every day in the morning.  \par \par Regarding osteopenia, patient has severe kyphosis March 2017 spine -2.3 wrist 0.2 fem neck -1.9 and is on Boniva tolerating well.  He was just Boniva due to good response to treatment.  He is currently on a drug holiday for osteoporosis treatment.\par

## 2023-03-20 NOTE — PHYSICAL EXAM
[Alert] : alert [Well Nourished] : well nourished [No Acute Distress] : no acute distress [Well Developed] : well developed [Normal Sclera/Conjunctiva] : normal sclera/conjunctiva [EOMI] : extra ocular movement intact [No Proptosis] : no proptosis [Normal Oropharynx] : the oropharynx was normal [Thyroid Not Enlarged] : the thyroid was not enlarged [No Thyroid Nodules] : no palpable thyroid nodules [No Respiratory Distress] : no respiratory distress [No Accessory Muscle Use] : no accessory muscle use [Clear to Auscultation] : lungs were clear to auscultation bilaterally [Normal S1, S2] : normal S1 and S2 [Normal Rate] : heart rate was normal [Regular Rhythm] : with a regular rhythm [No Edema] : no peripheral edema [Pedal Pulses Normal] : the pedal pulses are present [Normal Bowel Sounds] : normal bowel sounds [Not Tender] : non-tender [Not Distended] : not distended [Soft] : abdomen soft [Normal Anterior Cervical Nodes] : no anterior cervical lymphadenopathy [No Spinal Tenderness] : no spinal tenderness [Spine Straight] : spine straight [Kyphosis] : kyphosis present [No Stigmata of Cushings Syndrome] : no stigmata of Cushings Syndrome [Normal Gait] : normal gait [Normal Strength/Tone] : muscle strength and tone were normal [No Rash] : no rash [Normal Reflexes] : deep tendon reflexes were 2+ and symmetric [No Tremors] : no tremors [Oriented x3] : oriented to person, place, and time [Acanthosis Nigricans] : no acanthosis nigricans [de-identified] : Kyphosis

## 2023-03-20 NOTE — RESULTS/DATA
[FreeTextEntry1] : 03/20/2023 \par Model: Horizon W, serial number [id number]\par L1-L2: BMD 0.846, T score -1.9, Z score -0.9\par 2022: BMD 0.874, T score -1.6, -3.2% decrease *\par Total hip: BMD 0.851, T score -1.2, Z score -0.4\par 2022: BMD 0.854, T score -1.2, -0.3% decrease\par Femoral neck: BMD 0.694, T score -1.7, Z score -0.4\par 2022: BMD 0.719, T score -1.5, -3.5% decrease\par 33% radius: BMD 0.845, T score 0.5, Z score 2.1\par 2022: BMD 0.842, T score 0.5, 0.3% increase

## 2023-03-20 NOTE — ASSESSMENT
[FreeTextEntry1] : Mr. TANNA NASH is a 72year old male with well controlled Type 2 DM, HTN, HLD, osteopenia here for follow up visit.  \par \par 1. Type 2 DM\par A1c 6.5% March 13, 2023 which is within target.\par Recommend to continue with metformin  mg twice daily.  He checks glucose once a while.  Sometimes fasting in the morning 120s\par He is adherent with a carb consistent diet.\par Monitor glucose once daily.\par Up-to-date with ophthalmologist.\par Foot exam done today 3/20/2023, onychomycosis on first and second toes.\par \par 2. HLD\par Most recent LDL level is 43 mg/dL in October 2022\par LDL goal <70 mg/dL\par Following up with cardiology.\par Continue with Lipitor 20 mg once daily\par \par 3. HTN \par BP goal <1 30/80\par Rampirl was d/c due to hypotension.    \par Metoprolol Succinate ER 25mg once daily (started by Dr. Rea in Nov 2022)\par Continue to follow-up with cardiology.\par  \par 4. Osteopenia of multiple sites\par 03/20/2023 \par Model: Horizon W, serial number [id number]\par L1-L2: BMD 0.846, T score -1.9, Z score -0.9\par 2022: BMD 0.874, T score -1.6, -3.2% decrease *\par Total hip: BMD 0.851, T score -1.2, Z score -0.4\par 2022: BMD 0.854, T score -1.2, -0.3% decrease\par Femoral neck: BMD 0.694, T score -1.7, Z score -0.4\par 2022: BMD 0.719, T score -1.5, -3.5% decrease\par 33% radius: BMD 0.845, T score 0.5, Z score 2.1\par 2022: BMD 0.842, T score 0.5, 0.3% increase\par Given low to moderate risk of fracture. Stopped in March 2022\par Repeat bone mineral density March 202\par He takes Vitamin D 3, 2000 Iu daily.  \par No fractures, no falls.  \par Continue to monitor bone mineral density in 1 to 2 years.\par Continue with bisphosphonate drug holiday\par \par

## 2023-03-21 ENCOUNTER — APPOINTMENT (OUTPATIENT)
Dept: PULMONOLOGY | Facility: CLINIC | Age: 73
End: 2023-03-21
Payer: MEDICARE

## 2023-03-21 VITALS
DIASTOLIC BLOOD PRESSURE: 75 MMHG | OXYGEN SATURATION: 98 % | SYSTOLIC BLOOD PRESSURE: 113 MMHG | HEIGHT: 68.5 IN | HEART RATE: 55 BPM | WEIGHT: 158 LBS | TEMPERATURE: 96.8 F | BODY MASS INDEX: 23.67 KG/M2

## 2023-03-21 PROCEDURE — 99213 OFFICE O/P EST LOW 20 MIN: CPT

## 2023-03-22 DIAGNOSIS — Z11.59 ENCOUNTER FOR SCREENING FOR OTHER VIRAL DISEASES: ICD-10-CM

## 2023-03-22 LAB — NT-PROBNP SERPL-MCNC: 230 PG/ML

## 2023-03-28 VITALS
SYSTOLIC BLOOD PRESSURE: 131 MMHG | HEART RATE: 52 BPM | OXYGEN SATURATION: 98 % | WEIGHT: 156.09 LBS | RESPIRATION RATE: 16 BRPM | HEIGHT: 69 IN | DIASTOLIC BLOOD PRESSURE: 78 MMHG | TEMPERATURE: 97 F

## 2023-03-28 LAB — SARS-COV-2 N GENE NPH QL NAA+PROBE: NOT DETECTED

## 2023-03-28 RX ORDER — CHLORHEXIDINE GLUCONATE 213 G/1000ML
1 SOLUTION TOPICAL ONCE
Refills: 0 | Status: DISCONTINUED | OUTPATIENT
Start: 2023-03-30 | End: 2023-04-13

## 2023-03-28 NOTE — H&P ADULT - NSICDXPASTMEDICALHX_GEN_ALL_CORE_FT
PAST MEDICAL HISTORY:  Bladder polyp     BPH (benign prostatic hyperplasia)     CAD (coronary artery disease)     COVID-19     Diverticulosis     DM (diabetes mellitus)     HTN (hypertension)     Malignant melanoma of skin     Osteopenia

## 2023-03-28 NOTE — H&P ADULT - MUSCULOSKELETAL
Kyphosis/normal/ROM intact/strength 5/5 bilateral upper extremities/strength 5/5 bilateral lower extremities

## 2023-03-28 NOTE — H&P ADULT - HISTORY OF PRESENT ILLNESS
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 Marito Jackson for evaluated for an incidental pulmonary nodule, upper lobe fibrotic changes, and RV enlargement. Pt recently obtained a TTE 3/13/2023 which revealed EF 60-65% mitral valve prolapse involving the posterior mitral leaflet. Mild to mod MR, mild dilated left atrium. LA volume index = 40cc/m2. GIDD. Dilated right atrium. RA volume index = 45cc/m2. Right ventricular enlargement (RV basal diameter = 5.3cm) with decreased right ventricular systolic function. Mild TR. Pt is notable that he has had difficulty getting back to his physical activity. Denies any CP, palpitations, fever/chills, n/d/v, abdominal pain, LE edema. Given the persistence RV abnormality,  recentt symptoms with physical activity, WHO group II pulmonary HTN. pt is now referred to the cardiac cath for RHC.   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 Mraito Jackson for evaluated for an incidental pulmonary nodule, upper lobe fibrotic changes, and RV enlargement. Pt recently obtained a TTE 3/13/2023 which revealed EF 60-65% mitral valve prolapse involving the posterior mitral leaflet. Mild to mod MR, mild dilated left atrium. LA volume index = 40cc/m2. GIDD. Dilated right atrium. RA volume index = 45cc/m2. Right ventricular enlargement (RV basal diameter = 5.3cm) with decreased right ventricular systolic function. Mild TR. Pt is notable that he has had difficulty getting back to his physical activity. Denies any CP, palpitations, fever/chills, n/d/v, abdominal pain, LE edema. 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 pt is now referred to the cardiac cath for RHC.

## 2023-03-28 NOTE — H&P ADULT - NSHPLABSRESULTS_GEN_ALL_CORE
EKG: NSR 51, no ST segment elevations.                          14.4   6.43  )-----------( 162      ( 30 Mar 2023 07:51 )             42.6       03-30    137  |  101  |  20  ----------------------------<  124<H>  4.2   |  26  |  0.96    Ca    9.3      30 Mar 2023 07:52    TPro  7.3  /  Alb  4.2  /  TBili  0.9  /  DBili  x   /  AST  23  /  ALT  16  /  AlkPhos  94  03-30      PT/INR - ( 30 Mar 2023 07:51 )   PT: 13.1 sec;   INR: 1.10          PTT - ( 30 Mar 2023 07:51 )  PTT:35.6 sec    CARDIAC MARKERS ( 30 Mar 2023 07:52 )  x     / x     / 74 U/L / x     / 3.5 ng/mL

## 2023-03-28 NOTE — H&P ADULT - NSICDXFAMILYHX_GEN_ALL_CORE_FT
FAMILY HISTORY:  Father  Still living? Unknown  Family history of valvular heart disease, Age at diagnosis: Age Unknown  FH: CAD (coronary artery disease), Age at diagnosis: Age Unknown    Mother  Still living? Unknown  FH: HTN (hypertension), Age at diagnosis: Age Unknown  FH: pancreatic cancer, Age at diagnosis: Age Unknown

## 2023-03-28 NOTE — H&P ADULT - ASSESSMENT
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 pt now presents to the cardiac cath for RHC.        - ASA  III         Mallampati II  - VSS  - Fluids and load not indicated given RHC procedure.  - Suitable for moderate sedation.     Risks & benefits of procedure and alternative therapy have been explained to the patient including but not limited to: allergic reaction, bleeding w/possible need for blood transfusion, infection, renal and vascular compromise, limb damage, arrhythmia, stroke, vessel dissection/perforation, Myocardial infarction, emergent CABG. Informed consent obtained and in chart.

## 2023-03-29 ENCOUNTER — NON-APPOINTMENT (OUTPATIENT)
Age: 73
End: 2023-03-29

## 2023-03-30 ENCOUNTER — OUTPATIENT (OUTPATIENT)
Dept: OUTPATIENT SERVICES | Facility: HOSPITAL | Age: 73
LOS: 1 days | Discharge: HOME | End: 2023-03-30
Payer: MEDICARE

## 2023-03-30 DIAGNOSIS — Z98.890 OTHER SPECIFIED POSTPROCEDURAL STATES: Chronic | ICD-10-CM

## 2023-03-30 LAB
A1C WITH ESTIMATED AVERAGE GLUCOSE RESULT: 6.6 % — HIGH (ref 4–5.6)
ALBUMIN SERPL ELPH-MCNC: 4.2 G/DL — SIGNIFICANT CHANGE UP (ref 3.3–5)
ALP SERPL-CCNC: 94 U/L — SIGNIFICANT CHANGE UP (ref 40–120)
ALT FLD-CCNC: 16 U/L — SIGNIFICANT CHANGE UP (ref 10–45)
ANION GAP SERPL CALC-SCNC: 10 MMOL/L — SIGNIFICANT CHANGE UP (ref 5–17)
APTT BLD: 35.6 SEC — HIGH (ref 27.5–35.5)
AST SERPL-CCNC: 23 U/L — SIGNIFICANT CHANGE UP (ref 10–40)
BASOPHILS # BLD AUTO: 0.08 K/UL — SIGNIFICANT CHANGE UP (ref 0–0.2)
BASOPHILS NFR BLD AUTO: 1.2 % — SIGNIFICANT CHANGE UP (ref 0–2)
BILIRUB SERPL-MCNC: 0.9 MG/DL — SIGNIFICANT CHANGE UP (ref 0.2–1.2)
BUN SERPL-MCNC: 20 MG/DL — SIGNIFICANT CHANGE UP (ref 7–23)
CALCIUM SERPL-MCNC: 9.3 MG/DL — SIGNIFICANT CHANGE UP (ref 8.4–10.5)
CHLORIDE SERPL-SCNC: 101 MMOL/L — SIGNIFICANT CHANGE UP (ref 96–108)
CHOLEST SERPL-MCNC: 118 MG/DL — SIGNIFICANT CHANGE UP
CK MB CFR SERPL CALC: 3.5 NG/ML — SIGNIFICANT CHANGE UP (ref 0–6.7)
CK SERPL-CCNC: 74 U/L — SIGNIFICANT CHANGE UP (ref 30–200)
CO2 SERPL-SCNC: 26 MMOL/L — SIGNIFICANT CHANGE UP (ref 22–31)
COHGB MFR BLDV: 1.5 % — SIGNIFICANT CHANGE UP
COHGB MFR BLDV: 1.7 % — SIGNIFICANT CHANGE UP
CREAT SERPL-MCNC: 0.96 MG/DL — SIGNIFICANT CHANGE UP (ref 0.5–1.3)
EGFR: 83 ML/MIN/1.73M2 — SIGNIFICANT CHANGE UP
EOSINOPHIL # BLD AUTO: 0.81 K/UL — HIGH (ref 0–0.5)
EOSINOPHIL NFR BLD AUTO: 12.6 % — HIGH (ref 0–6)
ESTIMATED AVERAGE GLUCOSE: 143 MG/DL — HIGH (ref 68–114)
GLUCOSE BLDC GLUCOMTR-MCNC: 112 MG/DL — HIGH (ref 70–99)
GLUCOSE SERPL-MCNC: 124 MG/DL — HIGH (ref 70–99)
HCT VFR BLD CALC: 42.6 % — SIGNIFICANT CHANGE UP (ref 39–50)
HCT VFR BLDA CALC: 41 % — SIGNIFICANT CHANGE UP
HCT VFR BLDA CALC: 41 % — SIGNIFICANT CHANGE UP
HDLC SERPL-MCNC: 66 MG/DL — SIGNIFICANT CHANGE UP
HGB BLD CALC-MCNC: 13.7 G/DL — SIGNIFICANT CHANGE UP (ref 12.6–17.4)
HGB BLD CALC-MCNC: 13.8 G/DL — SIGNIFICANT CHANGE UP (ref 12.6–17.4)
HGB BLD-MCNC: 14.4 G/DL — SIGNIFICANT CHANGE UP (ref 13–17)
IMM GRANULOCYTES NFR BLD AUTO: 0.2 % — SIGNIFICANT CHANGE UP (ref 0–0.9)
INR BLD: 1.1 — SIGNIFICANT CHANGE UP (ref 0.88–1.16)
LIPID PNL WITH DIRECT LDL SERPL: 44 MG/DL — SIGNIFICANT CHANGE UP
LYMPHOCYTES # BLD AUTO: 1.4 K/UL — SIGNIFICANT CHANGE UP (ref 1–3.3)
LYMPHOCYTES # BLD AUTO: 21.8 % — SIGNIFICANT CHANGE UP (ref 13–44)
MCHC RBC-ENTMCNC: 31.2 PG — SIGNIFICANT CHANGE UP (ref 27–34)
MCHC RBC-ENTMCNC: 33.8 GM/DL — SIGNIFICANT CHANGE UP (ref 32–36)
MCV RBC AUTO: 92.2 FL — SIGNIFICANT CHANGE UP (ref 80–100)
METHGB MFR BLDV: 1 % — SIGNIFICANT CHANGE UP
METHGB MFR BLDV: 1.1 % — SIGNIFICANT CHANGE UP
MONOCYTES # BLD AUTO: 0.61 K/UL — SIGNIFICANT CHANGE UP (ref 0–0.9)
MONOCYTES NFR BLD AUTO: 9.5 % — SIGNIFICANT CHANGE UP (ref 2–14)
NEUTROPHILS # BLD AUTO: 3.52 K/UL — SIGNIFICANT CHANGE UP (ref 1.8–7.4)
NEUTROPHILS NFR BLD AUTO: 54.7 % — SIGNIFICANT CHANGE UP (ref 43–77)
NON HDL CHOLESTEROL: 52 MG/DL — SIGNIFICANT CHANGE UP
NRBC # BLD: 0 /100 WBCS — SIGNIFICANT CHANGE UP (ref 0–0)
PLATELET # BLD AUTO: 162 K/UL — SIGNIFICANT CHANGE UP (ref 150–400)
POTASSIUM SERPL-MCNC: 4.2 MMOL/L — SIGNIFICANT CHANGE UP (ref 3.5–5.3)
POTASSIUM SERPL-SCNC: 4.2 MMOL/L — SIGNIFICANT CHANGE UP (ref 3.5–5.3)
PROT SERPL-MCNC: 7.3 G/DL — SIGNIFICANT CHANGE UP (ref 6–8.3)
PROTHROM AB SERPL-ACNC: 13.1 SEC — SIGNIFICANT CHANGE UP (ref 10.5–13.4)
RBC # BLD: 4.62 M/UL — SIGNIFICANT CHANGE UP (ref 4.2–5.8)
RBC # FLD: 14.6 % — HIGH (ref 10.3–14.5)
SAO2 % BLDV: 75 % — SIGNIFICANT CHANGE UP (ref 67–88)
SAO2 % BLDV: 76 % — SIGNIFICANT CHANGE UP (ref 67–88)
SODIUM SERPL-SCNC: 137 MMOL/L — SIGNIFICANT CHANGE UP (ref 135–145)
TRIGL SERPL-MCNC: 39 MG/DL — SIGNIFICANT CHANGE UP
WBC # BLD: 6.43 K/UL — SIGNIFICANT CHANGE UP (ref 3.8–10.5)
WBC # FLD AUTO: 6.43 K/UL — SIGNIFICANT CHANGE UP (ref 3.8–10.5)

## 2023-03-30 PROCEDURE — 82550 ASSAY OF CK (CPK): CPT

## 2023-03-30 PROCEDURE — 93451 RIGHT HEART CATH: CPT | Mod: 26,GC

## 2023-03-30 PROCEDURE — 85610 PROTHROMBIN TIME: CPT

## 2023-03-30 PROCEDURE — 99152 MOD SED SAME PHYS/QHP 5/>YRS: CPT

## 2023-03-30 PROCEDURE — 99152 MOD SED SAME PHYS/QHP 5/>YRS: CPT | Mod: GC

## 2023-03-30 PROCEDURE — 93451 RIGHT HEART CATH: CPT

## 2023-03-30 PROCEDURE — 82553 CREATINE MB FRACTION: CPT

## 2023-03-30 PROCEDURE — 85025 COMPLETE CBC W/AUTO DIFF WBC: CPT

## 2023-03-30 PROCEDURE — 82803 BLOOD GASES ANY COMBINATION: CPT

## 2023-03-30 PROCEDURE — C1894: CPT

## 2023-03-30 PROCEDURE — C1889: CPT

## 2023-03-30 PROCEDURE — 85730 THROMBOPLASTIN TIME PARTIAL: CPT

## 2023-03-30 PROCEDURE — 93010 ELECTROCARDIOGRAM REPORT: CPT

## 2023-03-30 PROCEDURE — 36415 COLL VENOUS BLD VENIPUNCTURE: CPT

## 2023-03-30 PROCEDURE — 82962 GLUCOSE BLOOD TEST: CPT

## 2023-03-30 PROCEDURE — 80061 LIPID PANEL: CPT

## 2023-03-30 PROCEDURE — 80053 COMPREHEN METABOLIC PANEL: CPT

## 2023-03-30 PROCEDURE — 93005 ELECTROCARDIOGRAM TRACING: CPT

## 2023-03-30 PROCEDURE — 99153 MOD SED SAME PHYS/QHP EA: CPT

## 2023-03-30 PROCEDURE — 83036 HEMOGLOBIN GLYCOSYLATED A1C: CPT

## 2023-03-30 RX ORDER — TIMOLOL 0.5 %
1 DROPS OPHTHALMIC (EYE)
Refills: 0 | DISCHARGE

## 2023-03-30 RX ORDER — ATORVASTATIN CALCIUM 80 MG/1
1 TABLET, FILM COATED ORAL
Refills: 0 | DISCHARGE

## 2023-03-30 RX ORDER — LATANOPROST 0.05 MG/ML
1 SOLUTION/ DROPS OPHTHALMIC; TOPICAL
Refills: 0 | DISCHARGE

## 2023-03-30 RX ORDER — DUTASTERIDE 0.5 MG/1
1 CAPSULE, LIQUID FILLED ORAL
Refills: 0 | DISCHARGE

## 2023-03-30 RX ORDER — METOPROLOL TARTRATE 50 MG
1 TABLET ORAL
Refills: 0 | DISCHARGE

## 2023-03-30 RX ORDER — METFORMIN HYDROCHLORIDE 850 MG/1
2 TABLET ORAL
Refills: 0 | DISCHARGE

## 2023-03-30 NOTE — PROGRESS NOTE ADULT - SUBJECTIVE AND OBJECTIVE BOX
Interventional Cardiology PA SDA Discharge Note     s/p r RHC 3/30/23 RIJ access: normal pressures and no evidence of pHTN        Patient without complaints. 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 pt is  now s/p r RHC. with normal pressures and no evidence of pHTN      1.	Stable for discharge as per attending Dr. Chau  after 1h  bed rest  2.	Follow-up with PMD/Cardiologist _Dr Jackson  in 1-2 weeks  3.	Discharged forms signed and copies in chart

## 2023-03-31 DIAGNOSIS — I27.20 PULMONARY HYPERTENSION, UNSPECIFIED: ICD-10-CM

## 2023-04-12 LAB
ALBUMIN SERPL ELPH-MCNC: 4.5 G/DL
ALP BLD-CCNC: 82 U/L
ALT SERPL-CCNC: 29 U/L
ANION GAP SERPL CALC-SCNC: 9 MMOL/L
AST SERPL-CCNC: 24 U/L
BASOPHILS # BLD AUTO: 0.07 K/UL
BASOPHILS NFR BLD AUTO: 1.2 %
BILIRUB SERPL-MCNC: 1.2 MG/DL
BUN SERPL-MCNC: 19 MG/DL
CALCIUM SERPL-MCNC: 10 MG/DL
CHLORIDE SERPL-SCNC: 98 MMOL/L
CHOLEST SERPL-MCNC: 128 MG/DL
CO2 SERPL-SCNC: 29 MMOL/L
CREAT SERPL-MCNC: 0.99 MG/DL
EGFR: 80 ML/MIN/1.73M2
EOSINOPHIL # BLD AUTO: 0.45 K/UL
EOSINOPHIL NFR BLD AUTO: 7.9 %
ESTIMATED AVERAGE GLUCOSE: 140 MG/DL
GLUCOSE SERPL-MCNC: 164 MG/DL
HBA1C MFR BLD HPLC: 6.5 %
HCT VFR BLD CALC: 43.6 %
HDLC SERPL-MCNC: 71 MG/DL
HGB BLD-MCNC: 14.4 G/DL
IMM GRANULOCYTES NFR BLD AUTO: 0.2 %
LDLC SERPL CALC-MCNC: 48 MG/DL
LDLC SERPL DIRECT ASSAY-MCNC: 53 MG/DL
LYMPHOCYTES # BLD AUTO: 1.37 K/UL
LYMPHOCYTES NFR BLD AUTO: 24.1 %
MAN DIFF?: NORMAL
MCHC RBC-ENTMCNC: 30.2 PG
MCHC RBC-ENTMCNC: 33 GM/DL
MCV RBC AUTO: 91.4 FL
MONOCYTES # BLD AUTO: 0.67 K/UL
MONOCYTES NFR BLD AUTO: 11.8 %
NEUTROPHILS # BLD AUTO: 3.11 K/UL
NEUTROPHILS NFR BLD AUTO: 54.8 %
NONHDLC SERPL-MCNC: 57 MG/DL
PLATELET # BLD AUTO: 195 K/UL
POTASSIUM SERPL-SCNC: 4.6 MMOL/L
PROT SERPL-MCNC: 7.3 G/DL
RBC # BLD: 4.77 M/UL
RBC # FLD: 14.9 %
SODIUM SERPL-SCNC: 136 MMOL/L
T3 SERPL-MCNC: 75 NG/DL
T3RU NFR SERPL: 0.9 TBI
T4 FREE SERPL-MCNC: 1.4 NG/DL
T4 SERPL-MCNC: 6.4 UG/DL
TRIGL SERPL-MCNC: 50 MG/DL
TSH SERPL-ACNC: 2.28 UIU/ML
WBC # FLD AUTO: 5.68 K/UL

## 2023-04-17 NOTE — ASSESSMENT
[FreeTextEntry1] : Labs:\par 9/2022\par WBC 6.3 (), Hgb 14.6, Plts 184\par Na 138, K 4.7, Cl 101, HCO3 28, BUN/creat 24/0.9,\par Tbili 1.4, AST/ALT 19/14, Alk phos 63, TP/Albumin 7.4/4.7\par \par PFTs:                 2019\par FEV1/FVC         81%\par FEV1                 3.63, 117%\par FVC                   4.50, 106%\par TLC                   7.13, 108%\par RV                      2.62, 110%\par DLCO                 26.5, 103%\par -No significant bronchodilator response.\par \par TTE (12/2021):\par 1. Mitral valve prolapse involving the posterior mitral leaflet. Mild mitral regurgitation.\par 2. Sclerotic aortic valve leaflets with normal opening. No aortic valve regurgitation seen.\par 3. moderate dilated left atrium (LA volume index = 42 cc/m2)\par 4. Increased relative wall thickness with normal left ventricular mass index, consistent with concentric left ventricular remodeling.\par 5. No segmental wall motion abnormalities. Normal left ventricular systolic function (LVEF 60-65% by visual estimate).\par 6. Mild diastolic dysfunction (stage II).\par 7. Normal tricuspid valve. Mild tricuspid regurgitation.\par 8. Normal pulmonic valve. Minimal pulmonic regurgitation.\par [Normal right atrium. The right ventricle is not well visualized. Normal right ventricular size. Unable to determine right ventricular systolic function.]\par \par TTE (3/2023):\par 1. Mitral valve prolapse involving the posterior mitral leaflet. Mild-moderate mitral regurgitation.\par 2. Mild dilated left atrium. LA volume index 40 cc/M2.\par 3. Increased relative wall thickness with normal left ventricular mass index, consistent with concentric left ventricular remodeling.\par 4. No segmental wall motion abnormalities. Normal left ventricular systolic function (LVEF 60-65%)\par 5. Mild diastolic dysfunction (stage I)\par 6. Dilated right atrium. RA volume index 45 cc/M2\par 7. Right ventricular enlargement (RV basal diameter 5.3 cm) with decreased right ventricular systolic function\par 8. Normal tricuspid valve. Mild tricuspid regurgitation.\par \par Cardiac MRI (2019):\par IMPRESSION: \par 1. The RV is normal in size with borderline decreased systolic function; \par RVEF 43%. No segmental wall motion or RVOT motion abnormality. No late \par gadolinium enhancement to demonstrate ischemia, scar or fibrosis. \par 2. The LV is normal with normal systolic function; LVEF 62%. \par \par Cardiac Stress test (10/2022):\par Impressions: Normal study\par -The left ventricle was normal in size. There is a medium sized, mild defect in the inferior wall that is fixed, with normal wall motion, consistent with diaphragmatic attenuation artifact. The observed defect(s) are no longer significant with prone imaging.\par -Gated wall motion analysis is performed, and shows normal wall motion with post stress LVEF of 56% and post stress LVEDV of 95 mL.\par \par Chest CT (11/2022):\par IMPRESSION:\par Cardiac:\par 1. The calcium score is moderate at 175 Agatston units, which is at the 47 percentile, adjusted for age, gender and race.\par 2. Nonobstructive coronary artery disease.\par Non-cardiac:\par Nonspecific left lower lobe micronodule [6 mm]. Consider follow-up low-dose CT chest in 6 months.\par \par Chest CT (3/2022):\par IMPRESSION:\par Few small pulmonary nodules for which additional short-term interval follow-up can be performed, to optimally document long-term stability.\par No suspicious pulmonary finding.\par \par A/P:\par 72 yo M h/o CAD, COVID-19 x2 (last in 1/2023, both mild), and melanoma-in-situ (R calf, removal in 2013) is evaluated for an incidental pulmonary nodule, upper lobe fibrotic changes, and RV enlargement.\par \par Today, Mr. Najera reports feeling well. We obtained an earlier interval CT due to his history of melanoma. The LLL nodule is unchanged or slightly smaller. I think we should pursue surveillance imaging. Since no change in 4 months, I think we can align his next chest CT with his Dermatology visits (i.e., q6 months). We should plan to follow this nodule for 2 years or until resolution.\par \par Regarding his lung parenchyma, I do not appreciate fibrosis. There is biapical upper lobe scarring, which may be congenital, related to remote infection, or related to his kyphosis. \par \par Finally, his recent TTE again suggests RV abnormality, including enlargement and dysfunction. Similar findings were suggested on his cardiac MRI in 2019. Though he does not have edema, it is notable that he has had difficulty getting back to his physical activity "baseline." Given the persistence of his RV abnormality and recent symptoms with physical activity, I think we should evaluate his RV. WHO Group II PH related to diastolic dysfunction is most likely, though I would like to obtain a BNP, PFTs, and consider a RHC. I requested that he continue regular walking (without jogging) until we evaluate his right heart. He will call me with new symptoms.\par \par 1. Incidental pulmonary nodule, LLL, low risk for malignancy\par 2. Upper lobe lung scarring\par 3. RV enlargement and dysfunction\par 4. h/o Melanoma in situ\par 5. Dizziness during exercise in summer 2022\par \par -chest CT in 6 months to monitor LLL nodule\par -PFTs and 6MWT\par -check BNP today; will inquire about pulmonary hypertension clinic visit vs RHC; recommended avoidance of intense physical activity until the clinical picture is clearer; he will call me with new symptoms\par -Vaccinations: not addressed at this visit\par -follow-up with me in 3-4 weeks

## 2023-04-17 NOTE — PHYSICAL EXAM
[No Acute Distress] : no acute distress [Normal Appearance] : normal appearance [Supple] : supple [Normal Rate/Rhythm] : normal rate/rhythm [Normal S1, S2] : normal s1, s2 [No Murmurs] : no murmurs [No Resp Distress] : no resp distress [Clear to Auscultation Bilaterally] : clear to auscultation bilaterally [No Abnormalities] : no abnormalities [Benign] : benign [Normal Gait] : normal gait [No Clubbing] : no clubbing [No Cyanosis] : no cyanosis [No Edema] : no edema [FROM] : FROM [Normal Color/ Pigmentation] : normal color/ pigmentation [No Focal Deficits] : no focal deficits [Oriented x3] : oriented x3 [Normal Affect] : normal affect [TextBox_2] : Kyphosis. [TextBox_11] : NC/AT [TextBox_54] : ? S3

## 2023-04-17 NOTE — ADDENDUM
[FreeTextEntry1] : Addendum ([facility]; 3/22/23):\par I called Mr. Najera about his BNP, which was normal (230). I inquired about a RHC for Mr. Najera given the persistent RV enlargement and prior mild dysfunction (cardiac MRI in 2019). He was amenable. I will contact the pulmonary hypertension team to potentially perform prior to our next visit in 4/2023. Mr. Najera understood and agreed; he will call me with questions.\par \par Addendum ([facility]; 4/17/23):\par I called Mr. Najera about his catheterization:\par \par RHC\par PA        20/5 (11):\par PCWP  4\par CO/CI   3.4/1.83\par PVR     2\par \par With his normal RHC, I think increasing his physical activity is reasonable. I contacted Dr. Lopez, who agreed. We will discuss increasing his physical activity at his upcoming visit with me.

## 2023-04-17 NOTE — REASON FOR VISIT
[Follow-Up] : a follow-up visit [Pulmonary Nodules] : pulmonary nodules [TextBox_44] : RV enlargement

## 2023-04-17 NOTE — HISTORY OF PRESENT ILLNESS
[Never] : never [TextBox_4] : Mr. Galdino Najera returned to clinic today; he was accompanied by his wife, who supplemented his history. In review, Mr. 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 His evaluation included Holter monitoring x2, cardiac stress test, and cardiac CT. \par \par Mr. Najera has been cared for by Dr. Aldair Lopez for dizziness and bradycardia. Regarding prior pulmonary evaluations, Mr. Najera was evaluated by Dr. Eric Velasco in 6/2017 for a chest x-ray suggesting upper lobe fibrosis. Since there was no appreciable change in imaging since 2008, additional evaluation was not pursued. More recently, Mr. Najera was evaluated by Sleep Medicine in 2019 with a polysomnogram negative for IAIN. Due to suspected RV dysfunction, PFTs (normal) and cardiac MRI (slightly reduced RVEF) were obtained.\par \par I last saw Mr. Najera earlier this month at [facility] when we discussed earlier chest imaging due to his history of melanoma in situ. He did not have dyspnea, chest pain, wheezing, or cough during the episode or recently. He has not had recurrence of his melanoma in situ and reports undergoing q6 month skin evaluations.\par \par Today, Mr. Najera reports feeling "about the same." He recently obtained a TTE from Dr. Lopez that showed RV enlargement and dysfunction. He has continued walking regularly without limitation. He has not started running yet. He denies cough, chest pain, syncope, presyncope, or lower extremity edema. He has not had recurrence of his symptoms from the summer of 2022. He does not remember the details of his heart evaluation in 2019, though he does not remember significant symptoms. Overall, he feels well, though he has had difficulty getting back to his "normal" activity level.\par \par PMH:\par CAD\par BPH\par COVID-19 (5/2022, 1/2023; mild)\par Diverticulosis\par HTN\par HLD\par NSVT\par Mitral valve prolapse\par ? Upper lobe changes suspicious for pulmonary scarring\par Osteopenia\par DM\par Melanoma-in-situ\par Bladder polyp \par \par PSH:\par Hernia repair x2\par Melanoma-in-situ (R posterior calf s/p resection in 2013)\par Bladder polyp s/p resection\par \par FH:\par Mom: HTN, Pancreatic cancer (age 92)\par Dad: CAD, heart valve replacement\par \par SH:\par Mr. Najera and his wife live in [place]. He was born in [place]. 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 Najera is a never-smoker. He drinks alcohol 1X/month. He denies illicit drug use. He does not vape. \par \par Mr. Najera endorses exposure to copper and plastic fumes while working at his family's business. He denies exposure to Tuberculosis, Asbestos, mold, dust, smoke, heavy metals, Beryllium, birds, feathers, or hot tubs.

## 2023-04-25 ENCOUNTER — APPOINTMENT (OUTPATIENT)
Dept: PULMONOLOGY | Facility: CLINIC | Age: 73
End: 2023-04-25
Payer: MEDICARE

## 2023-04-25 VITALS
WEIGHT: 162 LBS | SYSTOLIC BLOOD PRESSURE: 107 MMHG | HEIGHT: 68.5 IN | HEART RATE: 53 BPM | OXYGEN SATURATION: 98 % | BODY MASS INDEX: 24.27 KG/M2 | TEMPERATURE: 97.7 F | DIASTOLIC BLOOD PRESSURE: 71 MMHG

## 2023-04-25 PROBLEM — N40.0 BENIGN PROSTATIC HYPERPLASIA WITHOUT LOWER URINARY TRACT SYMPTOMS: Chronic | Status: ACTIVE | Noted: 2023-03-28

## 2023-04-25 PROBLEM — E11.9 TYPE 2 DIABETES MELLITUS WITHOUT COMPLICATIONS: Chronic | Status: ACTIVE | Noted: 2023-03-28

## 2023-04-25 PROBLEM — C43.9 MALIGNANT MELANOMA OF SKIN, UNSPECIFIED: Chronic | Status: ACTIVE | Noted: 2023-03-28

## 2023-04-25 PROBLEM — K57.90 DIVERTICULOSIS OF INTESTINE, PART UNSPECIFIED, WITHOUT PERFORATION OR ABSCESS WITHOUT BLEEDING: Chronic | Status: ACTIVE | Noted: 2023-03-28

## 2023-04-25 PROBLEM — U07.1 COVID-19: Chronic | Status: ACTIVE | Noted: 2023-03-28

## 2023-04-25 PROBLEM — I25.10 ATHEROSCLEROTIC HEART DISEASE OF NATIVE CORONARY ARTERY WITHOUT ANGINA PECTORIS: Chronic | Status: ACTIVE | Noted: 2023-03-28

## 2023-04-25 PROBLEM — D41.4 NEOPLASM OF UNCERTAIN BEHAVIOR OF BLADDER: Chronic | Status: ACTIVE | Noted: 2023-03-28

## 2023-04-25 PROBLEM — M85.80 OTHER SPECIFIED DISORDERS OF BONE DENSITY AND STRUCTURE, UNSPECIFIED SITE: Chronic | Status: ACTIVE | Noted: 2023-03-28

## 2023-04-25 PROBLEM — I10 ESSENTIAL (PRIMARY) HYPERTENSION: Chronic | Status: ACTIVE | Noted: 2023-03-28

## 2023-04-25 PROCEDURE — ZZZZZ: CPT

## 2023-04-25 PROCEDURE — 94618 PULMONARY STRESS TESTING: CPT

## 2023-04-25 PROCEDURE — 94727 GAS DIL/WSHOT DETER LNG VOL: CPT

## 2023-04-25 PROCEDURE — 94729 DIFFUSING CAPACITY: CPT

## 2023-04-25 PROCEDURE — 94010 BREATHING CAPACITY TEST: CPT

## 2023-04-25 PROCEDURE — 99213 OFFICE O/P EST LOW 20 MIN: CPT

## 2023-04-25 PROCEDURE — 99213 OFFICE O/P EST LOW 20 MIN: CPT | Mod: 25

## 2023-04-25 NOTE — HISTORY OF PRESENT ILLNESS
[Never] : never [TextBox_4] : Mr. Galdino Najera returned to clinic today in follow-up for his incidental pulmonary nodule and RV enlargement. In review, Mr. 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 His evaluation has included Holter monitoring x2, cardiac stress test, cardiac CT, and RHC.\par \par Mr. Najera has been cared for by Dr. Aldair Lopez for dizziness and bradycardia. Regarding prior pulmonary evaluations, Mr. Najera was evaluated by Dr. Eric Velasco in 6/2017 for a chest x-ray suggesting upper lobe fibrosis. Since there was no appreciable change in imaging since 2008, additional evaluation was not pursued. More recently, Mr. Najera was evaluated by Sleep Medicine in 2019 with a polysomnogram negative for IAIN. Due to suspected RV dysfunction, PFTs (normal) and cardiac MRI (slightly reduced RVEF) were obtained.\par \par I last saw Mr. Najera in 3/2023 when he was slowly feeling better and inquiring about increasing his physical activity. Due to dizziness during activity and RV enlargement, we pursued RHC. There was no evidence of pulmonary hypertension.\par \par Today, Mr. Najera reports feeling "pretty good." He has started increasing his physical activity via faster walking. He is not yet jogging. He denies recent episodes of dizziness, syncope, or presyncope. He denies respiratory symptoms, including cough, fever, chills, or sputum production.\par \par PMH:\par CAD\par BPH\par COVID-19 (5/2022, 1/2023; mild)\par Diverticulosis\par HTN\par HLD\par NSVT\par Mitral valve prolapse\par ? Upper lobe changes suspicious for pulmonary scarring\par Osteopenia\par DM\par Melanoma-in-situ\par Bladder polyp \par \par PSH:\par Hernia repair x2\par Melanoma-in-situ (R posterior calf s/p resection in 2013)\par Bladder polyp s/p resection\par \par FH:\par Mom: HTN, Pancreatic cancer (age 92)\par Dad: CAD, heart valve replacement\par \par SH:\par Mr. Najera and his wife live in [place]. He was born in [place]. 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 Najera is a never-smoker. He drinks alcohol 1X/month. He denies illicit drug use. He does not vape. \par \par Mr. Najera endorses exposure to copper and plastic fumes while working at his family's business. He denies exposure to Tuberculosis, Asbestos, mold, dust, smoke, heavy metals, Beryllium, birds, feathers, or hot tubs.

## 2023-04-25 NOTE — PHYSICAL EXAM
[No Acute Distress] : no acute distress [Normal Appearance] : normal appearance [Supple] : supple [Normal Rate/Rhythm] : normal rate/rhythm [Normal S1, S2] : normal s1, s2 [No Resp Distress] : no resp distress [No Murmurs] : no murmurs [Clear to Auscultation Bilaterally] : clear to auscultation bilaterally [No Abnormalities] : no abnormalities [Benign] : benign [Normal Gait] : normal gait [No Clubbing] : no clubbing [No Cyanosis] : no cyanosis [FROM] : FROM [No Edema] : no edema [Normal Color/ Pigmentation] : normal color/ pigmentation [No Focal Deficits] : no focal deficits [Oriented x3] : oriented x3 [Normal Affect] : normal affect [TextBox_2] : Kyphosis. [TextBox_11] : NC/AT

## 2023-04-25 NOTE — ASSESSMENT
[FreeTextEntry1] : Labs:\par 9/2022\par WBC 6.3 (), Hgb 14.6, Plts 184\par Na 138, K 4.7, Cl 101, HCO3 28, BUN/creat 24/0.9,\par Tbili 1.4, AST/ALT 19/14, Alk phos 63, TP/Albumin 7.4/4.7\par \par BNP: normal (230)\par \par RHC (4/2023)\par PA        20/5 (11):\par PCWP  4\par CO/CI   3.4/1.83\par PVR     2\par \par PFTs:                 2019 2023\par FEV1/FVC         81%                    76%\par FEV1                 3.63, 117%          3.33, 117%\par FVC                   4.50, 106%          4.40, 112%\par TLC                   7.13, 108%          6.61, 98%\par RV                      2.62, 110%          2.21, 83%\par DLCO                 26.5, 103%          21.24, 86%\par -No significant bronchodilator response in 2019.\par \par 6MWT\par 4/2023: 6MWT distance 457 meters (1499 feet); lavon SpO2 on room air was 97%\par \par TTE (12/2021):\par 1. Mitral valve prolapse involving the posterior mitral leaflet. Mild mitral regurgitation.\par 2. Sclerotic aortic valve leaflets with normal opening. No aortic valve regurgitation seen.\par 3. moderate dilated left atrium (LA volume index = 42 cc/m2)\par 4. Increased relative wall thickness with normal left ventricular mass index, consistent with concentric left ventricular remodeling.\par 5. No segmental wall motion abnormalities. Normal left ventricular systolic function (LVEF 60-65% by visual estimate).\par 6. Mild diastolic dysfunction (stage II).\par 7. Normal tricuspid valve. Mild tricuspid regurgitation.\par 8. Normal pulmonic valve. Minimal pulmonic regurgitation.\par [Normal right atrium. The right ventricle is not well visualized. Normal right ventricular size. Unable to determine right ventricular systolic function.]\par \par TTE (3/2023):\par 1. Mitral valve prolapse involving the posterior mitral leaflet. Mild-moderate mitral regurgitation.\par 2. Mild dilated left atrium. LA volume index 40 cc/M2.\par 3. Increased relative wall thickness with normal left ventricular mass index, consistent with concentric left ventricular remodeling.\par 4. No segmental wall motion abnormalities. Normal left ventricular systolic function (LVEF 60-65%)\par 5. Mild diastolic dysfunction (stage I)\par 6. Dilated right atrium. RA volume index 45 cc/M2\par 7. Right ventricular enlargement (RV basal diameter 5.3 cm) with decreased right ventricular systolic function\par 8. Normal tricuspid valve. Mild tricuspid regurgitation.\par \par Cardiac MRI (2019):\par IMPRESSION: \par 1. The RV is normal in size with borderline decreased systolic function; \par RVEF 43%. No segmental wall motion or RVOT motion abnormality. No late \par gadolinium enhancement to demonstrate ischemia, scar or fibrosis. \par 2. The LV is normal with normal systolic function; LVEF 62%. \par \par Cardiac Stress test (10/2022):\par Impressions: Normal study\par -The left ventricle was normal in size. There is a medium sized, mild defect in the inferior wall that is fixed, with normal wall motion, consistent with diaphragmatic attenuation artifact. The observed defect(s) are no longer significant with prone imaging.\par -Gated wall motion analysis is performed, and shows normal wall motion with post stress LVEF of 56% and post stress LVEDV of 95 mL.\par \par Chest CT (11/2022):\par IMPRESSION:\par Cardiac:\par 1. The calcium score is moderate at 175 Agatston units, which is at the 47 percentile, adjusted for age, gender and race.\par 2. Nonobstructive coronary artery disease.\par Non-cardiac:\par Nonspecific left lower lobe micronodule [6 mm]. Consider follow-up low-dose CT chest in 6 months.\par \par Chest CT (3/2023):\par IMPRESSION:\par Few small pulmonary nodules for which additional short-term interval follow-up can be performed, to optimally document long-term stability.\par No suspicious pulmonary finding.\par \par A/P:\par 74 yo M h/o CAD, COVID-19 x2 (last in 1/2023, both mild), and melanoma-in-situ (R calf, removal in 2013) is evaluated for an incidental pulmonary nodule, upper lobe fibrotic changes, and RV enlargement.\par \par Mr. Najera has not had any respiratory symptoms. His TTE and cardiac MRI suggest RV dysfunction, though his BNP and RHC are normal, suggesting the RV changes may be a normal variant. I contacted Dr. Lopez, and we agree that continued increase in his physical activity is reasonable. \par \par For incidental findings, there are upper lobe scarring changes that may be related to kyphosis, prior infection, or congenital changes. His PFTs and 6MWT are normal. Without significance change, I would not recommend additional evaluation or treatment.\par \par For his LLL pulmonary nodule, the 3/2023 CT scan showed stability or improvement. Given his h/o melanoma in situ, I would still favor closer imaging for the first 12 months. We will repeat his CT in 6 months from his last (9/2023). I will see him back at that time and call him with CT results when available.\par \par 1. Incidental pulmonary nodule, LLL, low risk for malignancy\par 2. Upper lobe lung scarring\par 3. RV enlargement and dysfunction (normal RHC)\par 4. h/o Melanoma in situ\par 5. Dizziness during exercise in summer 2022\par \par -chest CT in 9/2023 to monitor LLL nodule; anticipate follow-up for 2 years or until resolved\par -appreciate the pulmonary hypertension's team performance of RHC\par -recommended slow increase in physical activity\par -Vaccinations: Influenza 2020, PCV-13 2015, PPSV-23 2016\par -follow-up with me in 5 months

## 2023-06-13 ENCOUNTER — APPOINTMENT (OUTPATIENT)
Dept: CARDIOLOGY | Facility: CLINIC | Age: 73
End: 2023-06-13
Payer: MEDICARE

## 2023-06-13 ENCOUNTER — NON-APPOINTMENT (OUTPATIENT)
Age: 73
End: 2023-06-13

## 2023-06-13 VITALS
OXYGEN SATURATION: 97 % | HEART RATE: 50 BPM | WEIGHT: 162 LBS | SYSTOLIC BLOOD PRESSURE: 110 MMHG | DIASTOLIC BLOOD PRESSURE: 78 MMHG | BODY MASS INDEX: 24.27 KG/M2

## 2023-06-13 VITALS — SYSTOLIC BLOOD PRESSURE: 114 MMHG | DIASTOLIC BLOOD PRESSURE: 74 MMHG

## 2023-06-13 DIAGNOSIS — I49.3 VENTRICULAR PREMATURE DEPOLARIZATION: ICD-10-CM

## 2023-06-13 PROCEDURE — 93000 ELECTROCARDIOGRAM COMPLETE: CPT

## 2023-06-13 PROCEDURE — 93040 RHYTHM ECG WITH REPORT: CPT | Mod: 59

## 2023-06-13 PROCEDURE — 99214 OFFICE O/P EST MOD 30 MIN: CPT

## 2023-06-13 NOTE — HISTORY OF PRESENT ILLNESS
[FreeTextEntry1] : 8/3/2021 - Office visit:\par He denies chest pain, shortness of breath, and palpitations.\par /74 recently, 105/63 at urologist.\par Gets PSAs with urologist.\par He denies chest pain, shortness of breath, and palpitations.\par \par 12/06/2021 - Office visit:\par S/P laser TURP.\par He denies chest pain, shortness of breath, palpitations, and dizziness.\par \par 03/03/2022 - Office visit:\par He systolic blood pressures occasionally go up to 140-143 mmHg for a day or so, and then come back down to the 120s.\par He sees urologist at [place], Dr. Jensen Morales, regularly, and gets PSAs.\par He denies chest pain, shortness of breath, palpitations, and dizziness.\par \par 06/09/2022 - Office visit:\par Had COVID - no residual symptoms.\par He denies chest pain, shortness of breath, palpitations, and dizziness.\par \par 09/16/2022 - Office visit:\par He runs 3 to 4 miles a week.  With running recently, he has noticed leg weakness and that his head feels "foggy," forcing him to have to walk instead.  He only experiences the symptoms with running.  \par He denies chest pain, shortness of breath, and palpitations.\par \par 12/15/2022 - Office visit:\par He is on metoprolol succinate 25 daily.\par CCTA - nonobstructive disease.\par He denies chest pain, shortness of breath, and palpitations.\par \par 03/13/2023 - Office visit:\par He denies chest pain, shortness of breath, and palpitations.\par He had a colonoscopy last week which revealed 1 small polyp; he was told there was no need for a repeat colonoscopy.\par He sees a urologist regularly for BPH, and has been obtaining annual PSAs from him.\par \par \par 06/13/2023 - Office visit:\par PCP: Dr. Cat Ardon (has yet to make an appointment)\par He denies chest pain, shortness of breath, palpitations, and dizziness.\par \par \par \par \par \par

## 2023-06-14 ENCOUNTER — NON-APPOINTMENT (OUTPATIENT)
Age: 73
End: 2023-06-14

## 2023-06-21 ENCOUNTER — RX RENEWAL (OUTPATIENT)
Age: 73
End: 2023-06-21

## 2023-06-21 RX ORDER — ATORVASTATIN CALCIUM 20 MG/1
20 TABLET, FILM COATED ORAL
Qty: 90 | Refills: 3 | Status: ACTIVE | COMMUNITY
Start: 2018-01-17 | End: 1900-01-01

## 2023-09-18 ENCOUNTER — APPOINTMENT (OUTPATIENT)
Dept: CT IMAGING | Facility: CLINIC | Age: 73
End: 2023-09-18
Payer: MEDICARE

## 2023-09-18 ENCOUNTER — OUTPATIENT (OUTPATIENT)
Dept: OUTPATIENT SERVICES | Facility: HOSPITAL | Age: 73
LOS: 1 days | End: 2023-09-18

## 2023-09-18 DIAGNOSIS — Z98.890 OTHER SPECIFIED POSTPROCEDURAL STATES: Chronic | ICD-10-CM

## 2023-09-18 PROCEDURE — 71250 CT THORAX DX C-: CPT | Mod: 26,MH

## 2023-09-19 ENCOUNTER — APPOINTMENT (OUTPATIENT)
Dept: PULMONOLOGY | Facility: CLINIC | Age: 73
End: 2023-09-19
Payer: MEDICARE

## 2023-09-19 VITALS
OXYGEN SATURATION: 95 % | BODY MASS INDEX: 23.52 KG/M2 | WEIGHT: 157 LBS | TEMPERATURE: 97.6 F | HEART RATE: 78 BPM | SYSTOLIC BLOOD PRESSURE: 112 MMHG | DIASTOLIC BLOOD PRESSURE: 74 MMHG | HEIGHT: 68.5 IN

## 2023-09-19 PROCEDURE — 99213 OFFICE O/P EST LOW 20 MIN: CPT

## 2023-09-29 ENCOUNTER — APPOINTMENT (OUTPATIENT)
Dept: CARDIOLOGY | Facility: CLINIC | Age: 73
End: 2023-09-29
Payer: MEDICARE

## 2023-09-29 VITALS — SYSTOLIC BLOOD PRESSURE: 112 MMHG | DIASTOLIC BLOOD PRESSURE: 86 MMHG

## 2023-09-29 VITALS — SYSTOLIC BLOOD PRESSURE: 140 MMHG | DIASTOLIC BLOOD PRESSURE: 80 MMHG | HEART RATE: 45 BPM | OXYGEN SATURATION: 100 %

## 2023-09-29 PROCEDURE — 93040 RHYTHM ECG WITH REPORT: CPT | Mod: 59

## 2023-09-29 PROCEDURE — 93306 TTE W/DOPPLER COMPLETE: CPT

## 2023-09-29 PROCEDURE — 93000 ELECTROCARDIOGRAM COMPLETE: CPT

## 2023-09-29 PROCEDURE — 99214 OFFICE O/P EST MOD 30 MIN: CPT

## 2023-10-01 ENCOUNTER — NON-APPOINTMENT (OUTPATIENT)
Age: 73
End: 2023-10-01

## 2023-10-20 ENCOUNTER — APPOINTMENT (OUTPATIENT)
Dept: ENDOCRINOLOGY | Facility: CLINIC | Age: 73
End: 2023-10-20

## 2023-11-20 ENCOUNTER — APPOINTMENT (OUTPATIENT)
Dept: ENDOCRINOLOGY | Facility: CLINIC | Age: 73
End: 2023-11-20
Payer: MEDICARE

## 2023-11-20 VITALS
HEART RATE: 50 BPM | OXYGEN SATURATION: 100 % | HEIGHT: 68.5 IN | SYSTOLIC BLOOD PRESSURE: 110 MMHG | DIASTOLIC BLOOD PRESSURE: 70 MMHG | BODY MASS INDEX: 23.67 KG/M2 | WEIGHT: 158 LBS

## 2023-11-20 DIAGNOSIS — M85.80 OTHER SPECIFIED DISORDERS OF BONE DENSITY AND STRUCTURE, UNSPECIFIED SITE: ICD-10-CM

## 2023-11-20 DIAGNOSIS — E11.9 TYPE 2 DIABETES MELLITUS W/OUT COMPLICATIONS: ICD-10-CM

## 2023-11-20 PROCEDURE — 99214 OFFICE O/P EST MOD 30 MIN: CPT | Mod: 25

## 2023-11-20 PROCEDURE — 83036 HEMOGLOBIN GLYCOSYLATED A1C: CPT | Mod: QW

## 2023-11-21 LAB — HBA1C MFR BLD HPLC: 6.5

## 2024-01-24 NOTE — REASON FOR VISIT
Comprehensive Health Assessment Program     Beto Bowers is a 72 y.o. here for his comprehensive health assessment.    Patient Active Problem List    Diagnosis Date Noted    Neutropenia (HCC) 07/28/2023    BMI 25.0-25.9,adult 11/08/2022    Overweight with body mass index (BMI) of 25 to 25.9 in adult 11/08/2022    Peripheral vascular disease, unspecified (HCC) 11/08/2022    Asplenia 06/07/2022    Acquired hypothyroidism 04/27/2021    Pure hypercholesterolemia 04/27/2021    ED (erectile dysfunction) 04/27/2021    Family history of prostate cancer 04/27/2021    History of colon polyps 04/27/2021    Family history of colon cancer 04/27/2021       Current Outpatient Medications   Medication Sig Dispense Refill    tadalafil (CIALIS) 10 MG tablet Take 0.5 Tablets by mouth as needed for Erectile Dysfunction (may take once daily prn. may increase to 10mg once daily prn after first day.). 90 Tablet 1    levothyroxine (SYNTHROID) 50 MCG Tab Take 1 Tablet by mouth every morning on an empty stomach. 100 Tablet 2    lovastatin (MEVACOR) 40 MG tablet Take 1 Tablet by mouth every evening. 100 Tablet 2    GLUCOSAMINE-CHONDROITIN ER PO Take  by mouth.      Turmeric (QC TUMERIC COMPLEX PO) Take  by mouth.      Cholecalciferol (VITAMIN D3 PO) Take 1 Tablet by mouth every day.      Omega-3 Fatty Acids (FISH OIL) 1000 MG Cap capsule Take 1,000 mg by mouth every day.      Ascorbic Acid (VITAMIN C) 1000 MG Tab Take 1 Tablet by mouth every day.       No current facility-administered medications for this visit.          Current supplements as per medication list.     Allergies:   Patient has no known allergies.  Social History     Tobacco Use    Smoking status: Never    Smokeless tobacco: Never   Vaping Use    Vaping Use: Never used   Substance Use Topics    Alcohol use: Not Currently     Alcohol/week: 1.8 oz     Types: 3 Cans of beer per week     Comment: 3 beers per week    Drug use: Never     Family History   Problem Relation  Age of Onset    Cancer Father         prostate    Cancer Brother     Colorectal Cancer Paternal Grandfather     Cancer Paternal Grandfather         colon    Diabetes Neg Hx     Hypertension Neg Hx     Hyperlipidemia Neg Hx     Stroke Neg Hx     Heart Disease Neg Hx     Ovarian Cancer Neg Hx     Tubal Cancer Neg Hx     Peritoneal Cancer Neg Hx     Breast Cancer Neg Hx      Beto  has a past medical history of Hyperlipidemia and Shoulder dislocation.   Past Surgical History:   Procedure Laterality Date    CATARACT EXTRACTION WITH IOL Bilateral 2021    SPLENECTOMY      ruptured while riding a horse       Screening:  In the last six months have you experienced any leakage of urine? No    Depression Screening  Little interest or pleasure in doing things?  0 - not at all  Feeling down, depressed , or hopeless? 0 - not at all  Patient Health Questionnaire Score: 0     If depressive symptoms identified deferred to follow up visit unless specifically addressed in assessment and plan.    Interpretation of PHQ-9 Total Score   Score Severity   1-4 No Depression   5-9 Mild Depression   10-14 Moderate Depression   15-19 Moderately Severe Depression   20-27 Severe Depression    Screening for Cognitive Impairment  Do you or any of your friends or family members have any concern about your memory? No  Three Minute Recall (Banana, Sunrise, Chair) 2/3    Duncan clock face with all 12 numbers and set the hands to show 20 past 8.  Yes 5/5  Cognitive concerns identified deferred for follow up unless specifically addressed in assessment and plan.    Fall Risk Assessment  Has the patient had two or more falls in the last year or any fall with injury in the last year?  No    Safety Assessment  Do you always wear your seatbelt?  Yes  Any changes to home needed to function safely? No  Difficulty hearing.  No  Patient counseled about all safety risks that were identified.    Functional Assessment ADLs  Are there any barriers preventing you  [Hyperlipidemia] : hyperlipidemia [Hypertension] : hypertension from cooking for yourself or meeting nutritional needs?  No.    Are there any barriers preventing you from driving safely or obtaining transportation?  No.    Are there any barriers preventing you from using a telephone or calling for help?  No    Are there any barriers preventing you from shopping?  No.    Are there any barriers preventing you from taking care of your own finances?  No    Are there any barriers preventing you from managing your medications?  No    Are there any barriers preventing you from showering, bathing or dressing yourself? No    Are there any barriers preventing you from doing housework or laundry? No  Are there any barriers preventing you from using the toilet?No  Are you currently engaging in any exercise or physical activity?  Yes. Lifts weights and walks     Self-Assessment of Health  What is your perception of your health? Excellent  Do you sleep more than six hours a night? Yes  In the past 7 days, how much did pain keep you from doing your normal work? None  Do you spend quality time with family or friends (virtually or in person)? Yes  Do you usually eat a heart healthy diet that constists of a variety of fruits, vegetables, whole grains and fiber? Yes  Do you eat foods high in fat and/or Fast Food more than three times per week? No    Advance Care Planning  Do you have an Advance Directive, Living Will, Durable Power of , or POLST? Yes  Advance Directive       is not on file - instructed patient to bring in a copy to scan into their chart      Health Maintenance Summary            Overdue - Meningococcal B Vaccine (1 of 4 - Increased Risk) Overdue - never done      No completion history exists for this topic.              Postponed - COVID-19 Vaccine (3 - 2023-24 season) Postponed until 1/24/2025 03/18/2021  Imm Admin: PFIZER PURPLE CAP SARS-COV-2 VACCINATION (12+)    02/25/2021  Imm Admin: PFIZER PURPLE CAP SARS-COV-2 VACCINATION (12+)              Annual Wellness  Visit (Yearly) Next due on 1/24/2025 01/24/2024  Level of Service: [place] ANNUAL WELLNESS VISIT-INCLUDES PPPS SUBSEQUE*    11/08/2022  Level of Service: [place] ANNUAL WELLNESS VISIT-INCLUDES PPPS SUBSEQUE*    10/27/2021  Level of Service: [place] ANNUAL WELLNESS VISIT-INCLUDES PPPS SUBSEQUE*    10/27/2021  Visit Dx: Medicare annual wellness visit, subsequent              Colorectal Cancer Screening (Colonoscopy - Every 4 Years) Next due on 7/20/2025 07/20/2021  REFERRAL TO GI FOR COLONOSCOPY    07/16/2021  REFERRAL TO GI FOR COLONOSCOPY    04/27/2016  Colonoscopy (Done - negative. per patient hx. had polyp prior and FH colon cancer)              IMM DTaP/Tdap/Td Vaccine (2 - Td or Tdap) Next due on 10/29/2026      10/29/2016  Imm Admin: Tdap Vaccine    02/23/1996  Imm Admin: TD Vaccine              Hepatitis C Screening  Tentatively Complete      05/24/2021  Hepatitis C Antibody component of HEP C VIRUS ANTIBODY              Pneumococcal Vaccine: 65+ Years (Series Information) Completed      07/28/2023  Imm Admin: Pneumococcal Conjugate Vaccine (PCV20)    10/27/2021  Imm Admin: Pneumococcal polysaccharide vaccine (PPSV-23)              Influenza Vaccine (Series Information) Completed      10/17/2023  Imm Admin: Influenza Vaccine Adult HD    11/08/2022  Imm Admin: Influenza Vaccine Adult HD    10/14/2021  Imm Admin: Influenza Vaccine Adult HD    09/20/2020  Imm Admin: Influenza Vaccine Adult HD    10/16/2019  Imm Admin: Influenza Vaccine Adult HD    Only the first 5 history entries have been loaded, but more history exists.              Zoster (Shingles) Vaccines (Series Information) Completed      10/17/2023  Imm Admin: Zoster Vaccine Recombinant (RZV) (SHINGRIX)    08/02/2023  Imm Admin: Zoster Vaccine Recombinant (RZV) (SHINGRIX)              Hepatitis A Vaccine (Hep A) (Series Information) Aged Out      No completion history exists for this topic.              Hepatitis B Vaccine (Hep B) (Series Information) Aged  "Out      No completion history exists for this topic.              HPV Vaccines (Series Information) Aged Out      No completion history exists for this topic.              Polio Vaccine (Inactivated Polio) (Series Information) Aged Out      No completion history exists for this topic.              Discontinued - Meningococcal Immunization  Discontinued      No completion history exists for this topic.                    Patient Care Team:  Diandra Martinez M.D. as PCP - General (Family Medicine)  MALACHI Grimm as PCP - [facility] Paneled (Family Medicine)  [facility] [place].  Madan Gan, PT, DPT as Physical Therapist (Physical Therapy)  Shorty Williamson M.D. as Attending Team Physician (Gastroenterology)      Financial Resource Strain: Low Risk  (1/24/2024)    Overall Financial Resource Strain (CARDIA)     Difficulty of Paying Living Expenses: Not hard at all      Transportation Needs: No Transportation Needs (1/24/2024)    PRAPARE - Transportation     Lack of Transportation (Medical): No     Lack of Transportation (Non-Medical): No      Food Insecurity: No Food Insecurity (1/24/2024)    Hunger Vital Sign     Worried About Running Out of Food in the Last Year: Never true     Ran Out of Food in the Last Year: Never true        Encounter Vitals  Blood Pressure : 120/68  Weight: 80.9 kg (178 lb 6.4 oz)  Height: 177.8 cm (5' 10\")  BMI (Calculated): 25.6  Pain Score: No pain     Alert, oriented in no acute distress.  Eye contact is good, speech goal directed, affect calm.    Assessment and Plan. The following treatment and monitoring plan is recommended:  Acquired hypothyroidism  Chronic, stable. Maintains on levothyroxine 50mcg daily. Last TSH from July 2023 WNL. Continue current treatment regime. Follow up with PCP at least annually for continued monitoring and management.    Asplenia  Chronic, stable. Removed in childhood secondary to injury. He is due for meningococcal B vaccine but has " run into difficulties getting this with pharmacy. Pt will discuss further with PCP next week.    Neutropenia (HCC)  Chronic, stable. Prior leukemia/lymphoma workup was normal. Follow up with PCP per routine for continued monitoring.     Latest Reference Range & Units 06/06/22 07:28 07/31/23 07:38   Neutrophils-Polys 44.00 - 72.00 % 26.60 (L) 32.00 (L)   (L): Data is abnormally low    Peripheral vascular disease, unspecified (HCC)  Chronic, stable. Pt is asymptomatic. He does not follow with vascular surgery. Continue lovastatin 40mg daily for HLD. Educated on CV risk modification factors include exercise; management of blood pressure, lipids, glucose and stress; no smoking.  Follow up with PCP at least annually for continued monitoring and management.    Pure hypercholesterolemia  Chronic, stable. Maintains on statin therapy without issue. Most recent lipid panel from July 2023 with mild elevation of LDL at 113. Continue lovastatin 40mg daily in addition to fish oil supplementation. Recommend Mediterranean diet and regular physical activity. Follow up with PCP at least annually for continued monitoring and management.   Lab Results   Component Value Date/Time    CHOLSTRLTOT 183 07/31/2023 07:38 AM     (H) 07/31/2023 07:38 AM    HDL 59 07/31/2023 07:38 AM    TRIGLYCERIDE 56 07/31/2023 07:38 AM           Services suggested: No services needed at this time  Health Care Screening: Age-appropriate preventive services recommended by USPTF and ACIP covered by Medicare were discussed today. Services ordered if indicated and agreed upon by the patient.  Referrals offered: Community-based lifestyle interventions to reduce health risks and promote self-management and wellness, fall prevention, nutrition, physical activity, tobacco-use cessation, weight loss, and mental health services as per orders if indicated.    Discussion today about general wellness and lifestyle habits:    Prevent falls and reduce trip hazards;  Cautioned about securing or removing rugs.  Have a working fire alarm and carbon monoxide detector.  Engage in regular physical activity and social activities.    Follow-up: Return for follow up visit with PCP as previously scheduled.

## 2024-02-01 ENCOUNTER — APPOINTMENT (OUTPATIENT)
Dept: CARDIOLOGY | Facility: CLINIC | Age: 74
End: 2024-02-01
Payer: MEDICARE

## 2024-02-01 ENCOUNTER — NON-APPOINTMENT (OUTPATIENT)
Age: 74
End: 2024-02-01

## 2024-02-01 VITALS — SYSTOLIC BLOOD PRESSURE: 122 MMHG | DIASTOLIC BLOOD PRESSURE: 82 MMHG

## 2024-02-01 VITALS
OXYGEN SATURATION: 99 % | WEIGHT: 162 LBS | HEART RATE: 43 BPM | DIASTOLIC BLOOD PRESSURE: 70 MMHG | BODY MASS INDEX: 24.27 KG/M2 | SYSTOLIC BLOOD PRESSURE: 122 MMHG

## 2024-02-01 DIAGNOSIS — I34.1 NONRHEUMATIC MITRAL (VALVE) PROLAPSE: ICD-10-CM

## 2024-02-01 PROCEDURE — 99214 OFFICE O/P EST MOD 30 MIN: CPT

## 2024-02-01 PROCEDURE — 93040 RHYTHM ECG WITH REPORT: CPT | Mod: 59

## 2024-02-01 PROCEDURE — G2211 COMPLEX E/M VISIT ADD ON: CPT

## 2024-02-01 PROCEDURE — 93000 ELECTROCARDIOGRAM COMPLETE: CPT

## 2024-02-01 NOTE — DISCUSSION/SUMMARY
[FreeTextEntry1] : PLAN: Continue atorvastatin 20 daily Office visit, BW, and echo 3 months  PHYSICAL EXAMINATION: Constitutional: well developed, well nourished, no acute distress Cardiovascular: rhythm was regular, normal S1 and S2, 1/6 systolic murmur; without jugular venous distention Respiratory: no respiratory distress, lungs clear to auscultation bilaterally GI: soft, non-tender, no abdominal mass palpated, no hepatosplenomegaly, bowel sounds normal Extremities: without clubbing, cyanosis, or edema Musculoskeletal: gait sufficient for exercise testing Neurologic: oriented X 3 Psych: affect normal

## 2024-02-01 NOTE — HISTORY OF PRESENT ILLNESS
[FreeTextEntry1] : 8/3/2021 - Office visit: He denies chest pain, shortness of breath, and palpitations. /73 recently, 105/63 at urologist. Gets PSAs with urologist. He denies chest pain, shortness of breath, and palpitations.  12/06/2021 - Office visit: S/P laser TURP. He denies chest pain, shortness of breath, palpitations, and dizziness.  03/03/2022 - Office visit: He systolic blood pressures occasionally go up to 140-143 mmHg for a day or so, and then come back down to the 120s. He sees urologist at [facility], Dr. Jensen Morales, regularly, and gets PSAs. He denies chest pain, shortness of breath, palpitations, and dizziness.  06/09/2022 - Office visit: Had COVID - no residual symptoms. He denies chest pain, shortness of breath, palpitations, and dizziness.  09/16/2022 - Office visit: He runs 3 to 4 miles a week.  With running recently, he has noticed leg weakness and that his head feels "foggy," forcing him to have to walk instead.  He only experiences the symptoms with running.   He denies chest pain, shortness of breath, and palpitations.  12/15/2022 - Office visit: He is on metoprolol succinate 25 daily. CCTA - nonobstructive disease. He denies chest pain, shortness of breath, and palpitations.  03/13/2023 - Office visit: He denies chest pain, shortness of breath, and palpitations. He had a colonoscopy last week which revealed 1 small polyp; he was told there was no need for a repeat colonoscopy. He sees a urologist regularly for BPH, and has been obtaining annual PSAs from him.   06/13/2023 - Office visit: PCP: Dr. Cat Ardon (has yet to make an appointment) He denies chest pain, shortness of breath, palpitations, and dizziness.  09/29/2023 - Office visit:    He denies chest pain, shortness of breath, and palpitations.  02/01/2024 - Office visit: He denies chest pain, shortness of breath, and palpitations.

## 2024-02-06 ENCOUNTER — NON-APPOINTMENT (OUTPATIENT)
Age: 74
End: 2024-02-06

## 2024-03-12 ENCOUNTER — APPOINTMENT (OUTPATIENT)
Dept: PULMONOLOGY | Facility: CLINIC | Age: 74
End: 2024-03-12
Payer: MEDICARE

## 2024-03-12 VITALS
HEART RATE: 53 BPM | TEMPERATURE: 97 F | SYSTOLIC BLOOD PRESSURE: 119 MMHG | WEIGHT: 162 LBS | RESPIRATION RATE: 12 BRPM | BODY MASS INDEX: 24.27 KG/M2 | HEIGHT: 68.5 IN | DIASTOLIC BLOOD PRESSURE: 76 MMHG | OXYGEN SATURATION: 99 %

## 2024-03-12 DIAGNOSIS — R91.1 SOLITARY PULMONARY NODULE: ICD-10-CM

## 2024-03-12 PROCEDURE — 99213 OFFICE O/P EST LOW 20 MIN: CPT

## 2024-03-13 NOTE — HISTORY OF PRESENT ILLNESS
[Never] : never [TextBox_4] : Mr. Galdino Najera returned to clinic today in follow-up for his incidental pulmonary nodule and RV enlargement. In review, Mr. 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 Mr. Najera's evaluation was obtained in the setting of feeling dizzy during jogging in the Summer of 2022. His evaluation has included Holter monitoring x2, cardiac stress test, cardiac CT, and RHC.  Mr. Najera has been cared for by Dr. Aldair Lopez for dizziness and bradycardia. Regarding prior pulmonary evaluation, Mr. Najera was evaluated by Dr. Eric Velasco in 6/2017 for a chest x-ray suggesting upper lobe fibrosis. Since there was no appreciable change in imaging since 2008, additional evaluation was not pursued. More recently, Mr. Najera was evaluated by Sleep Medicine in 2019 with a polysomnogram (negative for IAIN). Due to suspected RV dysfunction, PFTs (normal) and cardiac MRI (slightly reduced RVEF) were obtained. In 2023, Mr. Najera underwent a RHC that did not identify pulmonary hypertension.  I last saw Mr. Najera in 4/2023 when we discussed increasing his physical activity slowly.   9/2023: Mr. Najera feels "good." He has not had respiratory exacerbations requiring ER evaluation, hospitalization, antibiotics, or steroids. Mr. Najera has returned to jogging 2-3 miles intermittently. He denies physical activity limitation and feels he is "back to baseline." His dizziness has also improved.   3/2024: Mr. Najera continues to feel well. He has not had hospitalizations or urgent care visits since last visit. He is exercising well with 6 miles walk most days and 2-3 mile runs 1x/week. He denies any activity limitations or recurrence of dizziness. He is up to date on vaccines, will get covid19 booster at local pharmacy.   PMH: CAD BPH COVID-19 (5/2022, 1/2023; mild) Diverticulosis HTN HLD NSVT Mitral valve prolapse ? Upper lobe changes suspicious for pulmonary scaring Osteopenia DM Melanoma-in-situ Bladder polyp   PSH: Hernia repair x2 Melanoma-in-situ (R posterior calf s/p resection in 2013) Bladder polyp s/p resection  FH: Mom: HTN, Pancreatic cancer (age 92) Dad: CAD, heart valve replacement  SH: Mr. Najera and his wife live in [place]. He was born in [place]. 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  Mr. Najera is a never-smoker. He drinks alcohol 1X/month. He denies illicit drug use. He does not vape.   Mr. Najera endorses exposure to copper and plastic fumes while working at his family's business. He denies exposure to Tuberculosis, Asbestos, mold, dust, smoke, heavy metals, Beryllium, birds, feathers, or hot tubs.

## 2024-03-13 NOTE — PHYSICAL EXAM
[No Acute Distress] : no acute distress [Normal Appearance] : normal appearance [Supple] : supple [Normal Rate/Rhythm] : normal rate/rhythm [Normal S1, S2] : normal s1, s2 [No Murmurs] : no murmurs [No Resp Distress] : no resp distress [Clear to Auscultation Bilaterally] : clear to auscultation bilaterally [No Abnormalities] : no abnormalities [Benign] : benign [Normal Gait] : normal gait [No Clubbing] : no clubbing [No Cyanosis] : no cyanosis [No Edema] : no edema [FROM] : FROM [No Focal Deficits] : no focal deficits [Normal Color/ Pigmentation] : normal color/ pigmentation [Oriented x3] : oriented x3 [Normal Affect] : normal affect [TextBox_2] : Kyphosis. [TextBox_11] : NC/AT

## 2024-03-13 NOTE — REVIEW OF SYSTEMS
[Negative] : Musculoskeletal [Fever] : no fever [Fatigue] : no fatigue [Chills] : no chills [Cough] : no cough [Sputum] : no sputum [Dyspnea] : no dyspnea [SOB on Exertion] : no sob on exertion [Chest Discomfort] : no chest discomfort [Edema] : no edema [Orthopnea] : no orthopnea [Palpitations] : no palpitations [Dizziness] : no dizziness

## 2024-03-13 NOTE — ASSESSMENT
[FreeTextEntry1] : Labs: 3/2023 WBC 5.68 (), Hgb 14.4, Plts 195 Na 136, K 4.6, Cl 98, HCO3 29, BUN/creat 19/0.99, glucose 164, Ca 10.0 Tbili 1.2, AST/ALT 24/29, Alk phos 82, TP/Albumin 7.3/4.5  BNP: normal (230)  RHC (4/2023) PA        20/5 (11): PCWP  4 CO/CI   3.4/1.83 PVR     2  PFTs:                 2019 2023 FEV1/FVC         81%                    76% FEV1                 3.63, 117%          3.33, 117% FVC                   4.50, 106%          4.40, 112% TLC                   7.13, 108%          6.61, 98% RV                      2.62, 110%          2.21, 83% DLCO                 26.5, 103%          21.24, 86% -No significant bronchodilator response in 2019.  6MWT 4/2023: 6MWT distance 457 meters (1499 feet); lavon SpO2 on room air was 97%  TTE (12/2021): 1. Mitral valve prolapse involving the posterior mitral leaflet. Mild mitral regurgitation. 2. Sclerotic aortic valve leaflets with normal opening. No aortic valve regurgitation seen. 3. Moderate dilated left atrium (LA volume index = 42 cc/m2) 4. Increased relative wall thickness with normal left ventricular mass index, consistent with concentric left ventricular remodeling. 5. No segmental wall motion abnormalities. Normal left ventricular systolic function (LVEF 60-65% by visual estimate). 6. Mild diastolic dysfunction (stage II). 7. Normal tricuspid valve. Mild tricuspid regurgitation. 8. Normal pulmonic valve. Minimal pulmonic regurgitation. [Normal right atrium. The right ventricle is not well visualized. Normal right ventricular size. Unable to determine right ventricular systolic function.]  TTE (3/2023): 1. Mitral valve prolapse involving the posterior mitral leaflet. Mild-moderate mitral regurgitation. 2. Mild dilated left atrium. LA volume index 40 cc/M2. 3. Increased relative wall thickness with normal left ventricular mass index, consistent with concentric left ventricular remodeling. 4. No segmental wall motion abnormalities. Normal left ventricular systolic function (LVEF 60-65%) 5. Mild diastolic dysfunction (stage I) 6. Dilated right atrium. RA volume index 45 cc/M2 7. Right ventricular enlargement (RV basal diameter 5.3 cm) with decreased right ventricular systolic function 8. Normal tricuspid valve. Mild tricuspid regurgitation.  Cardiac MRI (2019): IMPRESSION:  1. The RV is normal in size with borderline decreased systolic function;  RVEF 43%. No segmental wall motion or RVOT motion abnormality. No late  gadolinium enhancement to demonstrate ischemia, scar or fibrosis.  2. The LV is normal with normal systolic function; LVEF 62%.   Cardiac Stress test (10/2022): Impressions: Normal study -The left ventricle was normal in size. There is a medium sized, mild defect in the inferior wall that is fixed, with normal wall motion, consistent with diaphragmatic attenuation artifact. The observed defect(s) are no longer significant with prone imaging. -Gated wall motion analysis is performed, and shows normal wall motion with post stress LVEF of 56% and post stress LVEDV of 95 mL.  Chest CT (11/2022): IMPRESSION: Cardiac: 1. The calcium score is moderate at 175 Agatston units, which is at the 47 percentile, adjusted for age, gender and race. 2. Nonobstructive coronary artery disease. Non-cardiac: Nonspecific left lower lobe micronodule [6 mm]. Consider follow-up low-dose CT chest in 6 months.  Chest CT (3/2023): IMPRESSION: Few small pulmonary nodules for which additional short-term interval follow-up can be performed, to optimally document long-term stability. No suspicious pulmonary finding.  Chest CT 9/2023 FINDINGS: LUNGS AND AIRWAYS: There is biapical pleural-parenchymal scarring. There is nonspecific reticulation of the subpleural interstitium. There is bibasilar parenchymal bandlike opacity/scarring. No suspicious pulmonary finding. No endobronchial lesion. No pulmonary consolidation or mass. There are a few scattered pulmonary nodules, largest of which measures 5 mm in the subpleural left lower lobe (image 150), stable since 11/2022. There is also a small a 3 mm nodule within the left upper lobe (image 97), stable since 3/20/2023. PLEURA: No pleural effusion. MEDIASTINUM AND ALEXANDRE: No lymphadenopathy. VESSELS: There is coronary arterial calcification. HEART: Heart size is normal. No pericardial effusion. CHEST WALL AND LOWER NECK: Within normal limits. VISUALIZED UPPER ABDOMEN: Within normal limits. BONES: Degenerative change of the lower cervical spine. Thoracic kyphosis. IMPRESSION: Nearly annual stability of pulmonary nodule in the left lower lobe; additional annual follow-up can be performed to optimally document two-year stability.  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  Mr. Najera feels "back to baseline" and has not had hospitalizations or exacerbations. For his RV enlargement, his RHC is re-assuring. The RV enlargement may be a normal variant or related to a component of diastolic dysfunction, which could be worse with exertion. We will continue surveillance.  For the incidental apical lung findings, his PFTs are normal. The upper lobe scarring changes that may be related to kyphosis, prior infection, or congenital changes.   For his LLL pulmonary nodule, he has had stability between 11/2022 to 9/2023. Given his history of melanoma in situ, will plan to follow this solid nodule for at least 2 years; next CT scan 9/2024.   1. Incidental pulmonary nodule, LLL, low risk for malignancy 2. Upper lobe lung scarring 3. RV enlargement and dysfunction (normal RHC) 4. h/o Melanoma in situ  -Chest CT repeat in 9/2024; plan to follow for at least 2 years given his h/o malignancy -continue surveillance plan with TTE  -congratulated on return to baseline physical activity -Vaccinations: Influenza and RSV in 2023; PCV-13 2015, PPSV-23 2016 -follow-up with me in 9/2024, after his follow-up chest CT

## 2024-03-25 ENCOUNTER — APPOINTMENT (OUTPATIENT)
Dept: ENDOCRINOLOGY | Facility: CLINIC | Age: 74
End: 2024-03-25
Payer: MEDICARE

## 2024-03-25 VITALS — DIASTOLIC BLOOD PRESSURE: 80 MMHG | OXYGEN SATURATION: 96 % | SYSTOLIC BLOOD PRESSURE: 118 MMHG | HEART RATE: 81 BPM

## 2024-03-25 VITALS — BODY MASS INDEX: 24.12 KG/M2 | WEIGHT: 161 LBS | HEIGHT: 68.5 IN

## 2024-03-25 DIAGNOSIS — J84.9 INTERSTITIAL PULMONARY DISEASE, UNSPECIFIED: ICD-10-CM

## 2024-03-25 DIAGNOSIS — M85.89 OTHER SPECIFIED DISORDERS OF BONE DENSITY AND STRUCTURE, MULTIPLE SITES: ICD-10-CM

## 2024-03-25 DIAGNOSIS — E78.5 HYPERLIPIDEMIA, UNSPECIFIED: ICD-10-CM

## 2024-03-25 PROCEDURE — 99214 OFFICE O/P EST MOD 30 MIN: CPT

## 2024-03-25 PROCEDURE — 77085 DXA BONE DENSITY AXL VRT FX: CPT | Mod: GA

## 2024-03-25 PROCEDURE — ZZZZZ: CPT

## 2024-03-25 NOTE — PHYSICAL EXAM
[Alert] : alert [Well Nourished] : well nourished [No Acute Distress] : no acute distress [Well Developed] : well developed [Normal Sclera/Conjunctiva] : normal sclera/conjunctiva [EOMI] : extra ocular movement intact [No Proptosis] : no proptosis [Normal Oropharynx] : the oropharynx was normal [Thyroid Not Enlarged] : the thyroid was not enlarged [No Thyroid Nodules] : no palpable thyroid nodules [No Accessory Muscle Use] : no accessory muscle use [No Respiratory Distress] : no respiratory distress [Clear to Auscultation] : lungs were clear to auscultation bilaterally [Normal S1, S2] : normal S1 and S2 [Normal Rate] : heart rate was normal [Regular Rhythm] : with a regular rhythm [No Edema] : no peripheral edema [Pedal Pulses Normal] : the pedal pulses are present [Not Tender] : non-tender [Normal Bowel Sounds] : normal bowel sounds [Not Distended] : not distended [Soft] : abdomen soft [Normal Anterior Cervical Nodes] : no anterior cervical lymphadenopathy [No Spinal Tenderness] : no spinal tenderness [Spine Straight] : spine straight [Kyphosis] : kyphosis present [Normal Gait] : normal gait [No Stigmata of Cushings Syndrome] : no stigmata of Cushings Syndrome [Normal Strength/Tone] : muscle strength and tone were normal [No Rash] : no rash [Acanthosis Nigricans] : no acanthosis nigricans [Normal Reflexes] : deep tendon reflexes were 2+ and symmetric [No Tremors] : no tremors [Oriented x3] : oriented to person, place, and time [de-identified] : Kyphosis

## 2024-03-25 NOTE — HISTORY OF PRESENT ILLNESS
[FreeTextEntry1] : Mr. TANNA NASH is a 74 year old male here to follow up for type 2 DM, osteopenia.   He has been diagnosed with Type 2 DM since around 2000.  He was controlled with diet and exercise until around 2014, when his A1c was greater than 6.5% that he was started on metformin before therapy.  He has been stable on the metformin doses for the last 6 years.  Currently taking 1500 mg once daily.  His A1c has been in the range of 6.4 to 6.8% the greatest.    Patient is up to date with his ophthalmologist, he last saw him last week. There is no diabetic retinopathy.  He has glaucoma therefore he goes to his ophthalmologist on a regular basis.  He has not been to his podiatrist for a while.  Patient with onychomycosis on his foot.  Has not been back directors for a while.  He reports no diabetic nephropathy.  He is on an ACE inhibitor due to mildly elevated microalbumin.  He is on an ACE inhibitor.  Albumin/creatinine ratio has been mildly elevated in the range of 30 to 40 mg/g over the last few years.  He follows with cardiologist.  He has no prior history of macrovascular disease such as CAD, CHF or CVA.   He is currently taking Lipitor at the recommendation of his cardiologist/pcp.     He checks his blood sugars every day in the morning.    Regarding osteopenia, patient has severe kyphosis March 2017 spine -2.3 wrist 0.2 fem neck -1.9 and is on Boniva tolerating well.  He was just Boniva due to good response to treatment.  He is currently on a drug holiday for osteoporosis treatment.  Noted to have mild elevation of microalbumin

## 2024-03-25 NOTE — RESULTS/DATA
[FreeTextEntry1] : Bone density L1-L4 3/25/2024: BMD 0.853, T-score -1.8, -2.0% #, 0.7% 3/20/2023: BMD 0.846, T-score -1.9, -2.8% #, -3.2%* 3/15/2022: BMD 0.874, T-score -1.6, 0.4% #, -2.1% 2/19/2021: BMD 0.893, T-score -1.5, 2.6% #, 1.4% # 2/4/2019: BMD 0.81, T-score -1.6, 1.2%, 5.0%* 3/9/2017: BMD 0.839, T-score -1.9, -3.6%*, -3.6%* 7/11/2012: BMD 0.870, T-score -1.7  Total hip 3/25/2024: BMD 0.830, T-score -1.3, -4.0% #, -2.5% 3/20/2023: BMD 0.851, T-score -1.2, -1.5% #, -0.3% 3/15/2022: BMD 0.854, T-score -1.2, -1.2% #, -0.3% 2/19/2021: BMD 0.856, T-score -1.2, -0.9% #, 2.4% # 2/4/2019: BMD 0.836, T-score -1.3, -3.2%*, 2.1% 7/11/2012: BMD 0.865, T-score -1.1  Femoral neck 3/25/2024: BMD 0.68 3, T-score -1.8, -2.3% #, -1.6% 3/20/2023: BMD 0.694, T-score -1.7, -0.7% #, -3.5% 3/15/2022: BMD 0.719, T-score -1.9, 2.9% #, 0.2% 2/19/2021: BMD 0.718, T-score -1.6, 2.7% #, 3.6% # 2/4/2019: BMD 0.693, T-score -1.7, -0.9%, 3.0% 3/9/2017: BMD 0.672, T-score -1.9, -3.8%, -3.8% 7/11/2012: BMD 0.699   10-year fracture risk Major osteoporotic fracture 7.7%, hip fracture 2.5%

## 2024-03-25 NOTE — ASSESSMENT
[FreeTextEntry1] : 74-year-old man with history of uncontrolled type 2 diabetes, hypertension, hyperlipidemia, osteopenia status post treatment with Fosamax, here for routine evaluation  1.  Type 2 diabetes Point-of-care A1c done today 6.5% Recommend to continue with metformin  mg twice daily. Fasting glucose in range. Patient inquired about continuous glucose monitoring system.  Reports no hypoglycemia. Type 2 diabetes, unlikely to be covered by his current medical insurance unless there is episode of hypoglycemia at the needed treatment.  Patient reported no subjective feeling of hypoglycemia.  Informed patient to continue monitoring his glucose. Recommend carb consistent diet Continue to monitor glucose once daily. Patient is up-to-date with his ophthalmologist, he may need cataract surgery in the future Foot exam done today 11/20/2023 within normal limits  DM regimen: Metformin 150 0 mg daily  2. HTN  Blood pressure target <1 30/80 Ramipril was held secondary to hypotension Currently taking metoprolol succinate ER 25 mg once daily Continue to follow-up with cardiology  3.  Hyperlipidemia LDL goal <70 mg/dL Continue with Lipitor 20 mg once daily Continue to follow-up with cardiology  4.  Osteoporosis/osteopenia Bone density as above. Stable.  Continue with drug holiday Repeat bone mineral density in 1 to 2 years.  5.  Microalbuminuria.  Albumin/creatinine ratio slightly elevated. Was on ACE inhibitor in the past but with hypotension.  He was taking ramipril 2.5 mg once daily. Can consider SGLT2 inhibitor but with a history of enlarged prostate, will hold off for now. Repeat albumin/creatinine ratio in 3 months. Will also repeat A1c

## 2024-04-04 LAB
25(OH)D3 SERPL-MCNC: 58.5 NG/ML
ALBUMIN SERPL ELPH-MCNC: 4.6 G/DL
ALP BLD-CCNC: 82 U/L
ALT SERPL-CCNC: 19 U/L
ANION GAP SERPL CALC-SCNC: 10 MMOL/L
AST SERPL-CCNC: 21 U/L
BILIRUB SERPL-MCNC: 1.1 MG/DL
BUN SERPL-MCNC: 21 MG/DL
CALCIUM SERPL-MCNC: 9.6 MG/DL
CHLORIDE SERPL-SCNC: 100 MMOL/L
CHOLEST SERPL-MCNC: 135 MG/DL
CO2 SERPL-SCNC: 29 MMOL/L
CREAT SERPL-MCNC: 1.07 MG/DL
CREAT SPEC-SCNC: 109 MG/DL
EGFR: 72 ML/MIN/1.73M2
ESTIMATED AVERAGE GLUCOSE: 148 MG/DL
GLUCOSE SERPL-MCNC: 137 MG/DL
HBA1C MFR BLD HPLC: 6.8 %
HDLC SERPL-MCNC: 77 MG/DL
LDLC SERPL CALC-MCNC: 47 MG/DL
MICROALBUMIN 24H UR DL<=1MG/L-MCNC: 6.5 MG/DL
MICROALBUMIN/CREAT 24H UR-RTO: 60 MG/G
NONHDLC SERPL-MCNC: 57 MG/DL
POTASSIUM SERPL-SCNC: 4.6 MMOL/L
PROT SERPL-MCNC: 7.5 G/DL
SODIUM SERPL-SCNC: 139 MMOL/L
TRIGL SERPL-MCNC: 46 MG/DL

## 2024-04-15 RX ORDER — BLOOD-GLUCOSE METER
W/DEVICE EACH MISCELLANEOUS
Qty: 1 | Refills: 0 | Status: ACTIVE | COMMUNITY
Start: 2024-04-15 | End: 1900-01-01

## 2024-04-15 RX ORDER — LANCETS
EACH MISCELLANEOUS
Qty: 400 | Refills: 1 | Status: ACTIVE | COMMUNITY
Start: 2022-03-01 | End: 1900-01-01

## 2024-04-15 RX ORDER — BLOOD SUGAR DIAGNOSTIC
STRIP MISCELLANEOUS
Qty: 2 | Refills: 3 | Status: ACTIVE | COMMUNITY
Start: 2018-01-09 | End: 1900-01-01

## 2024-04-15 RX ORDER — BLOOD-GLUCOSE METER
W/DEVICE EACH MISCELLANEOUS
Qty: 1 | Refills: 0 | Status: ACTIVE | COMMUNITY
Start: 2024-03-25 | End: 1900-01-01

## 2024-04-15 RX ORDER — LANCING DEVICE/LANCETS
KIT MISCELLANEOUS
Qty: 180 | Refills: 3 | Status: ACTIVE | COMMUNITY
Start: 2024-04-15 | End: 1900-01-01

## 2024-04-15 RX ORDER — BLOOD SUGAR DIAGNOSTIC
STRIP MISCELLANEOUS
Qty: 180 | Refills: 2 | Status: ACTIVE | COMMUNITY
Start: 2024-04-15 | End: 1900-01-01

## 2024-05-07 ENCOUNTER — APPOINTMENT (OUTPATIENT)
Dept: CARDIOLOGY | Facility: CLINIC | Age: 74
End: 2024-05-07
Payer: MEDICARE

## 2024-05-07 ENCOUNTER — NON-APPOINTMENT (OUTPATIENT)
Age: 74
End: 2024-05-07

## 2024-05-07 VITALS
DIASTOLIC BLOOD PRESSURE: 82 MMHG | OXYGEN SATURATION: 99 % | RESPIRATION RATE: 17 BRPM | HEIGHT: 68 IN | WEIGHT: 162 LBS | SYSTOLIC BLOOD PRESSURE: 124 MMHG | BODY MASS INDEX: 24.55 KG/M2 | HEART RATE: 44 BPM

## 2024-05-07 DIAGNOSIS — R00.1 BRADYCARDIA, UNSPECIFIED: ICD-10-CM

## 2024-05-07 DIAGNOSIS — I10 ESSENTIAL (PRIMARY) HYPERTENSION: ICD-10-CM

## 2024-05-07 DIAGNOSIS — I51.89 OTHER ILL-DEFINED HEART DISEASES: ICD-10-CM

## 2024-05-07 DIAGNOSIS — I51.7 CARDIOMEGALY: ICD-10-CM

## 2024-05-07 DIAGNOSIS — I47.29 OTHER VENTRICULAR TACHYCARDIA: ICD-10-CM

## 2024-05-07 DIAGNOSIS — I25.10 ATHEROSCLEROTIC HEART DISEASE OF NATIVE CORONARY ARTERY W/OUT ANGINA PECTORIS: ICD-10-CM

## 2024-05-07 PROCEDURE — 93000 ELECTROCARDIOGRAM COMPLETE: CPT

## 2024-05-07 PROCEDURE — 93040 RHYTHM ECG WITH REPORT: CPT | Mod: 59

## 2024-05-07 PROCEDURE — G2211 COMPLEX E/M VISIT ADD ON: CPT

## 2024-05-07 PROCEDURE — 93306 TTE W/DOPPLER COMPLETE: CPT

## 2024-05-07 PROCEDURE — 99214 OFFICE O/P EST MOD 30 MIN: CPT

## 2024-05-12 PROBLEM — I47.29 NSVT (NONSUSTAINED VENTRICULAR TACHYCARDIA): Status: ACTIVE | Noted: 2022-10-05

## 2024-05-12 PROBLEM — R00.1 SINUS BRADYCARDIA: Status: ACTIVE | Noted: 2018-01-17

## 2024-05-12 PROBLEM — I10 HYPERTENSION: Status: ACTIVE | Noted: 2018-01-17

## 2024-05-12 PROBLEM — I51.7 RIGHT VENTRICULAR ENLARGEMENT: Status: ACTIVE | Noted: 2019-04-10

## 2024-05-12 PROBLEM — I51.89 RIGHT VENTRICULAR SYSTOLIC DYSFUNCTION: Status: ACTIVE | Noted: 2019-04-10

## 2024-05-12 PROBLEM — I25.10 CAD (CORONARY ARTERY DISEASE): Status: ACTIVE | Noted: 2022-12-25

## 2024-05-12 NOTE — HISTORY OF PRESENT ILLNESS
[FreeTextEntry1] : 8/3/2021 - Office visit: He denies chest pain, shortness of breath, and palpitations. /74 recently, 105/63 at urologist. Gets PSAs with urologist. He denies chest pain, shortness of breath, and palpitations.  12/06/2021 - Office visit: S/P laser TURP. He denies chest pain, shortness of breath, palpitations, and dizziness.  03/03/2022 - Office visit: He systolic blood pressures occasionally go up to 140-143 mmHg for a day or so, and then come back down to the 120s. He sees urologist at [facility], Dr. Jensen Morales, regularly, and gets PSAs. He denies chest pain, shortness of breath, palpitations, and dizziness.  06/09/2022 - Office visit: Had COVID - no residual symptoms. He denies chest pain, shortness of breath, palpitations, and dizziness.  09/16/2022 - Office visit: He runs 3 to 4 miles a week.  With running recently, he has noticed leg weakness and that his head feels "foggy," forcing him to have to walk instead.  He only experiences the symptoms with running.   He denies chest pain, shortness of breath, and palpitations.  12/15/2022 - Office visit: He is on metoprolol succinate 25 daily. CCTA - nonobstructive disease. He denies chest pain, shortness of breath, and palpitations.  03/13/2023 - Office visit: He denies chest pain, shortness of breath, and palpitations. He had a colonoscopy last week which revealed 1 small polyp; he was told there was no need for a repeat colonoscopy. He sees a urologist regularly for BPH, and has been obtaining annual PSAs from him.   06/13/2023 - Office visit: PCP: Dr. Cat Ardon (has yet to make an appointment) He denies chest pain, shortness of breath, palpitations, and dizziness.  09/29/2023 - Office visit:    He denies chest pain, shortness of breath, and palpitations.  02/01/2024 - Office visit: He denies chest pain, shortness of breath, and palpitations.  05/07/2024 - Office visit: PCP: Harmony Nichols He denies chest pain, shortness of breath, and palpitations.

## 2024-05-12 NOTE — DISCUSSION/SUMMARY
[FreeTextEntry1] : PLAN: Hypertension - continue metoprolol ER 25 daily,  Hypercholesterolemia - continue atorvastatin 20 daily Follow BPs at home; call me in 3 weeks Subsequent care with cardiologist in [place]  VITAL SIGNS: BP: 154/94, 155/92, 144/92, 144/93  PHYSICAL EXAMINATION: Constitutional: well developed, well nourished, no acute distress Cardiovascular: rhythm was regular, normal S1 and S2, 1/6 systolic murmur; without jugular venous distention Respiratory: no respiratory distress, lungs clear to auscultation bilaterally GI: soft, non-tender, no abdominal mass palpated, no hepatosplenomegaly, bowel sounds normal Extremities: without clubbing, cyanosis, or edema Musculoskeletal: gait sufficient for exercise testing Neurologic: oriented X 3 Psych: affect normal

## 2024-05-13 ENCOUNTER — NON-APPOINTMENT (OUTPATIENT)
Age: 74
End: 2024-05-13

## 2024-06-27 ENCOUNTER — NON-APPOINTMENT (OUTPATIENT)
Age: 74
End: 2024-06-27

## 2024-06-28 ENCOUNTER — NON-APPOINTMENT (OUTPATIENT)
Age: 74
End: 2024-06-28

## 2024-09-17 ENCOUNTER — NON-APPOINTMENT (OUTPATIENT)
Age: 74
End: 2024-09-17

## 2024-09-19 ENCOUNTER — OUTPATIENT (OUTPATIENT)
Dept: OUTPATIENT SERVICES | Facility: HOSPITAL | Age: 74
LOS: 1 days | End: 2024-09-19
Payer: MEDICARE

## 2024-09-19 ENCOUNTER — APPOINTMENT (OUTPATIENT)
Dept: CT IMAGING | Facility: HOSPITAL | Age: 74
End: 2024-09-19

## 2024-09-19 DIAGNOSIS — Z98.890 OTHER SPECIFIED POSTPROCEDURAL STATES: Chronic | ICD-10-CM

## 2024-09-19 PROCEDURE — 71250 CT THORAX DX C-: CPT

## 2024-09-19 PROCEDURE — 71250 CT THORAX DX C-: CPT | Mod: 26,MH

## 2024-10-07 ENCOUNTER — APPOINTMENT (OUTPATIENT)
Dept: ENDOCRINOLOGY | Facility: CLINIC | Age: 74
End: 2024-10-07
Payer: MEDICARE

## 2024-10-07 VITALS
BODY MASS INDEX: 23.49 KG/M2 | SYSTOLIC BLOOD PRESSURE: 118 MMHG | WEIGHT: 155 LBS | HEART RATE: 52 BPM | HEIGHT: 68 IN | OXYGEN SATURATION: 99 % | DIASTOLIC BLOOD PRESSURE: 76 MMHG

## 2024-10-07 DIAGNOSIS — M85.89 OTHER SPECIFIED DISORDERS OF BONE DENSITY AND STRUCTURE, MULTIPLE SITES: ICD-10-CM

## 2024-10-07 DIAGNOSIS — M85.80 OTHER SPECIFIED DISORDERS OF BONE DENSITY AND STRUCTURE, UNSPECIFIED SITE: ICD-10-CM

## 2024-10-07 DIAGNOSIS — A77.49 OTHER EHRLICHIOSIS: ICD-10-CM

## 2024-10-07 DIAGNOSIS — E11.9 TYPE 2 DIABETES MELLITUS W/OUT COMPLICATIONS: ICD-10-CM

## 2024-10-07 PROCEDURE — 99214 OFFICE O/P EST MOD 30 MIN: CPT

## 2024-10-15 ENCOUNTER — NON-APPOINTMENT (OUTPATIENT)
Age: 74
End: 2024-10-15

## 2024-10-15 ENCOUNTER — APPOINTMENT (OUTPATIENT)
Dept: PULMONOLOGY | Facility: CLINIC | Age: 74
End: 2024-10-15
Payer: MEDICARE

## 2024-10-15 VITALS
HEART RATE: 67 BPM | WEIGHT: 149 LBS | BODY MASS INDEX: 22.58 KG/M2 | HEIGHT: 68 IN | OXYGEN SATURATION: 99 % | DIASTOLIC BLOOD PRESSURE: 70 MMHG | TEMPERATURE: 97.3 F | SYSTOLIC BLOOD PRESSURE: 102 MMHG

## 2024-10-15 PROCEDURE — 99213 OFFICE O/P EST LOW 20 MIN: CPT

## 2024-10-29 ENCOUNTER — APPOINTMENT (OUTPATIENT)
Dept: OTOLARYNGOLOGY | Facility: CLINIC | Age: 74
End: 2024-10-29
Payer: MEDICARE

## 2024-10-29 VITALS — BODY MASS INDEX: 23.79 KG/M2 | HEIGHT: 68 IN | WEIGHT: 157 LBS

## 2024-10-29 DIAGNOSIS — J34.2 DEVIATED NASAL SEPTUM: ICD-10-CM

## 2024-10-29 DIAGNOSIS — H90.5 UNSPECIFIED SENSORINEURAL HEARING LOSS: ICD-10-CM

## 2024-10-29 DIAGNOSIS — H90.3 SENSORINEURAL HEARING LOSS, BILATERAL: ICD-10-CM

## 2024-10-29 PROCEDURE — 92567 TYMPANOMETRY: CPT

## 2024-10-29 PROCEDURE — 99203 OFFICE O/P NEW LOW 30 MIN: CPT

## 2024-10-29 PROCEDURE — 92557 COMPREHENSIVE HEARING TEST: CPT

## 2025-04-21 ENCOUNTER — APPOINTMENT (OUTPATIENT)
Dept: ENDOCRINOLOGY | Facility: CLINIC | Age: 75
End: 2025-04-21
Payer: MEDICARE

## 2025-04-21 VITALS
SYSTOLIC BLOOD PRESSURE: 122 MMHG | WEIGHT: 154 LBS | DIASTOLIC BLOOD PRESSURE: 80 MMHG | BODY MASS INDEX: 23.34 KG/M2 | HEIGHT: 68 IN

## 2025-04-21 DIAGNOSIS — M85.80 OTHER SPECIFIED DISORDERS OF BONE DENSITY AND STRUCTURE, UNSPECIFIED SITE: ICD-10-CM

## 2025-04-21 DIAGNOSIS — I10 ESSENTIAL (PRIMARY) HYPERTENSION: ICD-10-CM

## 2025-04-21 DIAGNOSIS — E11.9 TYPE 2 DIABETES MELLITUS W/OUT COMPLICATIONS: ICD-10-CM

## 2025-04-21 DIAGNOSIS — E78.5 HYPERLIPIDEMIA, UNSPECIFIED: ICD-10-CM

## 2025-04-21 LAB — HBA1C MFR BLD HPLC: 6.7

## 2025-04-21 PROCEDURE — 99214 OFFICE O/P EST MOD 30 MIN: CPT

## 2025-04-21 PROCEDURE — 83036 HEMOGLOBIN GLYCOSYLATED A1C: CPT | Mod: QW

## 2025-09-19 ENCOUNTER — APPOINTMENT (OUTPATIENT)
Dept: CT IMAGING | Facility: HOSPITAL | Age: 75
End: 2025-09-19